# Patient Record
Sex: FEMALE | Race: WHITE | Employment: OTHER | URBAN - NONMETROPOLITAN AREA
[De-identification: names, ages, dates, MRNs, and addresses within clinical notes are randomized per-mention and may not be internally consistent; named-entity substitution may affect disease eponyms.]

---

## 2019-03-18 ENCOUNTER — TELEPHONE (OUTPATIENT)
Dept: FAMILY MEDICINE CLINIC | Age: 64
End: 2019-03-18

## 2019-04-04 ENCOUNTER — OFFICE VISIT (OUTPATIENT)
Dept: FAMILY MEDICINE CLINIC | Age: 64
End: 2019-04-04
Payer: COMMERCIAL

## 2019-04-04 VITALS
BODY MASS INDEX: 26.2 KG/M2 | SYSTOLIC BLOOD PRESSURE: 132 MMHG | WEIGHT: 163 LBS | HEART RATE: 76 BPM | DIASTOLIC BLOOD PRESSURE: 70 MMHG | RESPIRATION RATE: 16 BRPM | HEIGHT: 66 IN

## 2019-04-04 DIAGNOSIS — E53.8 B12 DEFICIENCY: ICD-10-CM

## 2019-04-04 DIAGNOSIS — Z00.00 ANNUAL PHYSICAL EXAM: Primary | ICD-10-CM

## 2019-04-04 DIAGNOSIS — Z12.31 ENCOUNTER FOR SCREENING MAMMOGRAM FOR MALIGNANT NEOPLASM OF BREAST: ICD-10-CM

## 2019-04-04 DIAGNOSIS — Z23 NEED FOR TDAP VACCINATION: ICD-10-CM

## 2019-04-04 DIAGNOSIS — E04.1 THYROID NODULE: ICD-10-CM

## 2019-04-04 PROCEDURE — 99386 PREV VISIT NEW AGE 40-64: CPT | Performed by: FAMILY MEDICINE

## 2019-04-04 PROCEDURE — 90471 IMMUNIZATION ADMIN: CPT | Performed by: FAMILY MEDICINE

## 2019-04-04 PROCEDURE — 90715 TDAP VACCINE 7 YRS/> IM: CPT | Performed by: FAMILY MEDICINE

## 2019-04-04 RX ORDER — LANOLIN ALCOHOL/MO/W.PET/CERES
500 CREAM (GRAM) TOPICAL DAILY
COMMUNITY

## 2019-04-04 ASSESSMENT — ENCOUNTER SYMPTOMS
ABDOMINAL PAIN: 0
NAUSEA: 0
VOMITING: 0
DIARRHEA: 0
BLOOD IN STOOL: 0
SHORTNESS OF BREATH: 0
EYES NEGATIVE: 1

## 2019-04-04 ASSESSMENT — PATIENT HEALTH QUESTIONNAIRE - PHQ9
SUM OF ALL RESPONSES TO PHQ QUESTIONS 1-9: 2
SUM OF ALL RESPONSES TO PHQ QUESTIONS 1-9: 2
SUM OF ALL RESPONSES TO PHQ9 QUESTIONS 1 & 2: 2
2. FEELING DOWN, DEPRESSED OR HOPELESS: 1
1. LITTLE INTEREST OR PLEASURE IN DOING THINGS: 1

## 2019-04-04 NOTE — PROGRESS NOTES
Mammogram scheduled at [address] [place] [address] 5/1/19 at 920 am  STR LOS DIGITAL SCREEN W OR WO CAD BILATERAL  PREPS: * Arrive 15 minutes prior * No powder, lotion, or deodorant under the arms or around the breast area * No perfumes or scented products  [facility] [facility] [address], [place], [place]    Thyroid US scheduled at [facility] on 5/1/19 at 10 am  STR US HEAD NECK SOFT TISSUE THYROID  PREPS:  * Arrive 30 minutes prior  * Bring a list of current medications  * Please report to [address] [place] [address] Radiology 1st floor         Pt notified of appt date, times and prep    Immunizations     Name Date Dose Route    Tdap (Boostrix, Adacel) 4/4/2019 0.5 mL Intramuscular    Site: Deltoid- Left    Lot: [id number]    NDC: 23049-326-57        After obtaining consent, and per orders of Dr. Arash Aparicio, injection of Adacel given in Left deltoid by Irving Poe.  Patient tolerated well

## 2019-04-04 NOTE — PATIENT INSTRUCTIONS
Patient Education        Well Visit, Women 48 to 72: Care Instructions  Your Care Instructions    Physical exams can help you stay healthy. Your doctor has checked your overall health and may have suggested ways to take good care of yourself. He or she also may have recommended tests. At home, you can help prevent illness with healthy eating, regular exercise, and other steps. Follow-up care is a key part of your treatment and safety. Be sure to make and go to all appointments, and call your doctor if you are having problems. It's also a good idea to know your test results and keep a list of the medicines you take. How can you care for yourself at home? · Reach and stay at a healthy weight. This will lower your risk for many problems, such as obesity, diabetes, heart disease, and high blood pressure. · Get at least 30 minutes of exercise on most days of the week. Walking is a good choice. You also may want to do other activities, such as running, swimming, cycling, or playing tennis or team sports. · Do not smoke. Smoking can make health problems worse. If you need help quitting, talk to your doctor about stop-smoking programs and medicines. These can increase your chances of quitting for good. · Protect your skin from too much sun. When you're outdoors from 10 a.m. to 4 p.m., stay in the shade or cover up with clothing and a hat with a wide brim. Wear sunglasses that block UV rays. Even when it's cloudy, put broad-spectrum sunscreen (SPF 30 or higher) on any exposed skin. · See a dentist one or two times a year for checkups and to have your teeth cleaned. · Wear a seat belt in the car. · Limit alcohol to 1 drink a day. Too much alcohol can cause health problems. Follow your doctor's advice about when to have certain tests. These tests can spot problems early. · Cholesterol.  Your doctor will tell you how often to have this done based on your age, family history, or other things that can increase your risk for heart attack and stroke. · Blood pressure. Have your blood pressure checked during a routine doctor visit. Your doctor will tell you how often to check your blood pressure based on your age, your blood pressure results, and other factors. · Mammogram. Ask your doctor how often you should have a mammogram, which is an X-ray of your breasts. A mammogram can spot breast cancer before it can be felt and when it is easiest to treat. · Pap test and pelvic exam. Ask your doctor how often you should have a Pap test. You may not need to have a Pap test as often as you used to. · Vision. Have your eyes checked every year or two or as often as your doctor suggests. Some experts recommend that you have yearly exams for glaucoma and other age-related eye problems starting at age 48. · Hearing. Tell your doctor if you notice any change in your hearing. You can have tests to find out how well you hear. · Diabetes. Ask your doctor whether you should have tests for diabetes. · Colon cancer. You should begin tests for colon cancer at age 48. You may have one of several tests. Your doctor will tell you how often to have tests based on your age and risk. Risks include whether you already had a precancerous polyp removed from your colon or whether your parents, sisters and brothers, or children have had colon cancer. · Thyroid disease. Talk to your doctor about whether to have your thyroid checked as part of a regular physical exam. Women have an increased chance of a thyroid problem. · Osteoporosis. You should begin tests for bone density at age 72. If you are younger than 72, ask your doctor whether you have factors that may increase your risk for this disease. You may want to have this test before age 72. · Heart attack and stroke risk. At least every 4 to 6 years, you should have your risk for heart attack and stroke assessed.  Your doctor uses factors such as your age, blood pressure, cholesterol, and whether you smoke or have diabetes to show what your risk for a heart attack or stroke is over the next 10 years. When should you call for help? Watch closely for changes in your health, and be sure to contact your doctor if you have any problems or symptoms that concern you. Where can you learn more? Go to [email or web address] and sign in to your [employer] account. Enter Z424 in the "[employer] box to learn more about \"Well Visit, Women 50 to 72: Care Instructions. \"     If you do not have an account, please click on the \"Sign Up Now\" link. Current as of: March 28, 2018  Content Version: 11.9  © 7987-6095 [employer]. Care instructions adapted under license by [facility] [place] ([facility]). If you have questions about a medical condition or this instruction, always ask your healthcare professional. [address] any warranty or liability for your use of this information.

## 2019-04-04 NOTE — PROGRESS NOTES
Chief Complaint   Patient presents with    Established New Doctor     Wellness check - due for thyroid u/s -- has known thyroid nodules. Needs order for mammogram.       Eric Avendano is a 58 y. o.female      Pt presents to establish care. Previous PCP in [place]. She is retired and is now caring for her  and her sister, who are both dealing with illnesses. She feels pretty well. She remains active and exercises regularly. Due for labs, mammogram, colon cancer screening. Has known thyroid nodules but hasn't had an ultrasound in a number of years. History as below. Nonsmoker. Body mass index is 26.31 kg/m². Current medical problems include:  Patient Active Problem List   Diagnosis    B12 deficiency           Past Medical History:   Diagnosis Date    B12 deficiency     Thyroid nodule        Past Surgical History:   Procedure Laterality Date    APPENDECTOMY      HYSTERECTOMY, TOTAL ABDOMINAL      WRIST SURGERY      tendon surgery       No Known Allergies       Current Outpatient Medications:     vitamin D (CHOLECALCIFEROL) 1000 UNIT TABS tablet, Take 500 Units by mouth daily, Disp: , Rfl:     vitamin B-12 (CYANOCOBALAMIN) 1000 MCG tablet, Take 1,000 mcg by mouth daily, Disp: , Rfl:     Family History   Problem Relation Age of Onset    Cancer Mother 36        breast ca, ovarian ca    Breast Cancer Mother     Cancer Father         lung ca    Cancer Sister 48        breast ca    Breast Cancer Other         colon cancer, ovarian cancer (sisters)    Cancer Sister         Ovarian         Social History     Tobacco Use    Smoking status: Never Smoker    Smokeless tobacco: Never Used   Substance Use Topics    Alcohol use: Yes     Comment: socially    Drug use: Never     , Retired. Review of Systems   Constitutional: Negative for chills, fatigue and fever. HENT: Negative. Eyes: Negative. Respiratory: Negative for shortness of breath. Cardiovascular: Negative for chest pain, palpitations and leg swelling. Gastrointestinal: Negative for abdominal pain, blood in stool, diarrhea, nausea and vomiting. Genitourinary: Negative for dysuria. Musculoskeletal: Negative for arthralgias and myalgias. Skin: Negative for rash. Neurological: Negative for dizziness and headaches. Hematological: Negative for adenopathy. Psychiatric/Behavioral: Negative. All other systems reviewed and are negative. OBJECTIVE     /70 (Site: Left Upper Arm, Position: Sitting, Cuff Size: Medium Adult)   Pulse 76   Resp 16   Ht 5' 6\" (1.676 m)   Wt 163 lb (73.9 kg)   Breastfeeding? No   BMI 26.31 kg/m²     Physical Exam   Constitutional: She is oriented to person, place, and time. She appears well-developed and well-nourished. HENT:   Head: Normocephalic and atraumatic. Mouth/Throat: Oropharynx is clear and moist.   TM's normal.   Eyes: Conjunctivae are normal.   Neck: Neck supple. Carotid bruit is not present. No thyromegaly present. Cardiovascular: Normal rate, regular rhythm and normal heart sounds. No murmur heard. Pulmonary/Chest: Effort normal and breath sounds normal. She has no wheezes. Abdominal: Soft. Bowel sounds are normal. There is no tenderness. There is no rebound and no guarding. Musculoskeletal: She exhibits no edema. Lymphadenopathy:     She has no cervical adenopathy. Neurological: She is alert and oriented to person, place, and time. Skin: Skin is warm and dry. No rash noted. Psychiatric: She has a normal mood and affect. Her behavior is normal.   Vitals reviewed.             Immunization History   Administered Date(s) Administered    Tdap (Boostrix, Adacel) 04/04/2019         Health Maintenance   Topic Date Due    Hepatitis C screen  1955    HIV screen  08/23/1969    Lipid screen  08/23/1994    Breast cancer screen  08/23/1994    Diabetes screen  08/23/1994    Shingles Vaccine (1 of 2) 08/23/2004    Colon cancer screen colonoscopy  08/23/2004    Flu vaccine (Season Ended) 09/01/2019    DTaP/Tdap/Td vaccine (2 - Td) 04/04/2029         ASSESSMENT      1. Annual physical exam    2. Thyroid nodule    3. Encounter for screening mammogram for malignant neoplasm of breast    4. B12 deficiency    5. Need for Tdap vaccination            PLAN       Testing as below    She will let us know when she's ready for referral for screening colonoscopy    Orders Placed This Encounter   Procedures    US HEAD NECK SOFT TISSUE THYROID     Standing Status:   Future     Standing Expiration Date:   4/4/2020     Scheduling Instructions:      [facility]. Wednesdays best     Order Specific Question:   Reason for exam:     Answer:   follow up thyroid nodules    LOS DIGITAL SCREEN W CAD BILATERAL     Standing Status:   Future     Standing Expiration Date:   6/4/2020     Order Specific Question:   Reason for exam:     Answer:   screening    Tdap (age 10y-63y) IM (Adacel)    Comprehensive Metabolic Panel     Standing Status:   Future     Standing Expiration Date:   4/3/2020    CBC Auto Differential     Standing Status:   Future     Standing Expiration Date:   4/4/2020    Lipid Panel     Standing Status:   Future     Standing Expiration Date:   4/3/2020     Order Specific Question:   Is Patient Fasting?/# of Hours     Answer:   12    TSH with Reflex     Standing Status:   Future     Standing Expiration Date:   4/4/2020    Vitamin B12 & Folate     Standing Status:   Future     Standing Expiration Date:   4/4/2020     Cindy received counseling on the following healthy behaviors: nutrition, exercise and medication adherence    Patient given educational materials on wellness for age. Discussed use, benefit, and side effects of prescribed medications. Barriers to medication compliance addressed. All patient questions answered. Pt voiced understanding.             Electronically signed by Kit Moss MD on 4/4/2019 at 7:28 PM

## 2019-05-01 ENCOUNTER — TELEPHONE (OUTPATIENT)
Dept: FAMILY MEDICINE CLINIC | Age: 64
End: 2019-05-01

## 2019-05-01 ENCOUNTER — HOSPITAL ENCOUNTER (OUTPATIENT)
Age: 64
Discharge: HOME | End: 2019-05-01
Payer: COMMERCIAL

## 2019-05-01 ENCOUNTER — HOSPITAL ENCOUNTER (OUTPATIENT)
Dept: ULTRASOUND IMAGING | Age: 64
Discharge: HOME | End: 2019-05-01
Payer: COMMERCIAL

## 2019-05-01 ENCOUNTER — HOSPITAL ENCOUNTER (OUTPATIENT)
Dept: WOMENS IMAGING | Age: 64
Discharge: HOME | End: 2019-05-01
Payer: COMMERCIAL

## 2019-05-01 DIAGNOSIS — E04.1 THYROID NODULE: ICD-10-CM

## 2019-05-01 DIAGNOSIS — Z00.00 ANNUAL PHYSICAL EXAM: ICD-10-CM

## 2019-05-01 DIAGNOSIS — E04.1 LEFT THYROID NODULE: Primary | ICD-10-CM

## 2019-05-01 DIAGNOSIS — E53.8 B12 DEFICIENCY: ICD-10-CM

## 2019-05-01 DIAGNOSIS — Z12.31 ENCOUNTER FOR SCREENING MAMMOGRAM FOR MALIGNANT NEOPLASM OF BREAST: ICD-10-CM

## 2019-05-01 LAB
ALBUMIN SERPL-MCNC: 4 G/DL (ref 3.5–5.1)
ALP BLD-CCNC: 83 U/L (ref 38–126)
ALT SERPL-CCNC: 12 U/L (ref 11–66)
ANION GAP SERPL CALCULATED.3IONS-SCNC: 7 MEQ/L (ref 8–16)
AST SERPL-CCNC: 15 U/L (ref 5–40)
BASOPHILS # BLD: 0.7 %
BASOPHILS ABSOLUTE: 0 THOU/MM3 (ref 0–0.1)
BILIRUB SERPL-MCNC: 0.3 MG/DL (ref 0.3–1.2)
BUN BLDV-MCNC: 14 MG/DL (ref 7–22)
CALCIUM SERPL-MCNC: 8.8 MG/DL (ref 8.5–10.5)
CHLORIDE BLD-SCNC: 107 MEQ/L (ref 98–111)
CHOLESTEROL, TOTAL: 231 MG/DL (ref 100–199)
CO2: 26 MEQ/L (ref 23–33)
CREAT SERPL-MCNC: 0.5 MG/DL (ref 0.4–1.2)
EOSINOPHIL # BLD: 2.1 %
EOSINOPHILS ABSOLUTE: 0.1 THOU/MM3 (ref 0–0.4)
ERYTHROCYTE [DISTWIDTH] IN BLOOD BY AUTOMATED COUNT: 12.6 % (ref 11.5–14.5)
ERYTHROCYTE [DISTWIDTH] IN BLOOD BY AUTOMATED COUNT: 43.1 FL (ref 35–45)
FOLATE: 12.5 NG/ML (ref 4.8–24.2)
GFR SERPL CREATININE-BSD FRML MDRD: > 90 ML/MIN/1.73M2
GLUCOSE BLD-MCNC: 105 MG/DL (ref 70–108)
HCT VFR BLD CALC: 41.8 % (ref 37–47)
HDLC SERPL-MCNC: 62 MG/DL
HEMOGLOBIN: 13.6 GM/DL (ref 12–16)
IMMATURE GRANS (ABS): 0 THOU/MM3 (ref 0–0.07)
IMMATURE GRANULOCYTES: 0 %
LDL CHOLESTEROL CALCULATED: 155 MG/DL
LYMPHOCYTES # BLD: 36.7 %
LYMPHOCYTES ABSOLUTE: 1.6 THOU/MM3 (ref 1–4.8)
MCH RBC QN AUTO: 30.3 PG (ref 26–33)
MCHC RBC AUTO-ENTMCNC: 32.5 GM/DL (ref 32.2–35.5)
MCV RBC AUTO: 93.1 FL (ref 81–99)
MONOCYTES # BLD: 8.1 %
MONOCYTES ABSOLUTE: 0.3 THOU/MM3 (ref 0.4–1.3)
NUCLEATED RED BLOOD CELLS: 0 /100 WBC
PLATELET # BLD: 277 THOU/MM3 (ref 130–400)
PMV BLD AUTO: 11.1 FL (ref 9.4–12.4)
POTASSIUM SERPL-SCNC: 4.5 MEQ/L (ref 3.5–5.2)
RBC # BLD: 4.49 MILL/MM3 (ref 4.2–5.4)
SEG NEUTROPHILS: 52.4 %
SEGMENTED NEUTROPHILS ABSOLUTE COUNT: 2.3 THOU/MM3 (ref 1.8–7.7)
SODIUM BLD-SCNC: 140 MEQ/L (ref 135–145)
TOTAL PROTEIN: 6.6 G/DL (ref 6.1–8)
TRIGL SERPL-MCNC: 68 MG/DL (ref 0–199)
TSH SERPL DL<=0.05 MIU/L-ACNC: 0.81 UIU/ML (ref 0.4–4.2)
VITAMIN B-12: 1328 PG/ML (ref 211–911)
WBC # BLD: 4.3 THOU/MM3 (ref 4.8–10.8)

## 2019-05-01 PROCEDURE — 82746 ASSAY OF FOLIC ACID SERUM: CPT

## 2019-05-01 PROCEDURE — 82607 VITAMIN B-12: CPT

## 2019-05-01 PROCEDURE — 77063 BREAST TOMOSYNTHESIS BI: CPT

## 2019-05-01 PROCEDURE — 80061 LIPID PANEL: CPT

## 2019-05-01 PROCEDURE — 85025 COMPLETE CBC W/AUTO DIFF WBC: CPT

## 2019-05-01 PROCEDURE — 36415 COLL VENOUS BLD VENIPUNCTURE: CPT

## 2019-05-01 PROCEDURE — 84443 ASSAY THYROID STIM HORMONE: CPT

## 2019-05-01 PROCEDURE — 80053 COMPREHEN METABOLIC PANEL: CPT

## 2019-05-01 PROCEDURE — 76536 US EXAM OF HEAD AND NECK: CPT

## 2019-05-01 NOTE — TELEPHONE ENCOUNTER
Patient notified of results and is agreeable to FNA biopsy. Prefers [facility], after 11 am, pt cant do on 5/10/19. Notified pt we will call tomorrow to schedule.

## 2019-05-01 NOTE — TELEPHONE ENCOUNTER
Patient notified of lab results and that she can decrease her b12 supplement to 1/2 tab daily    Pt verbalized understanding.

## 2019-05-01 NOTE — TELEPHONE ENCOUNTER
----- Message from Estiven Parikh MD sent at 5/1/2019  4:24 PM EDT -----  Notify her that her thyroid US shows multiple nodules, two of which are considered intermediate suspicion for malignancy. FNA biopsy is recommended for the nodule in the left lower lobe . Please arrange for her if she wants.  CG

## 2019-05-02 NOTE — TELEPHONE ENCOUNTER
Order faxed to IR at [phone]  They will contact her to schedule.   Patient notified IR will contact her to schedule the appt and to call us if she does not hear from them by early next week

## 2019-05-09 ENCOUNTER — HOSPITAL ENCOUNTER (OUTPATIENT)
Dept: WOMENS IMAGING | Age: 64
Discharge: HOME | End: 2019-05-09
Payer: COMMERCIAL

## 2019-05-09 DIAGNOSIS — Z00.6 ENCOUNTER FOR EXAMINATION FOR NORMAL COMPARISON OR CONTROL IN CLINICAL RESEARCH PROGRAM: ICD-10-CM

## 2019-05-09 PROCEDURE — 3209999900 MAM COMPARISON OF OUTSIDE IMAGES

## 2019-05-13 ENCOUNTER — TELEPHONE (OUTPATIENT)
Dept: FAMILY MEDICINE CLINIC | Age: 64
End: 2019-05-13

## 2019-05-13 ENCOUNTER — HOSPITAL ENCOUNTER (OUTPATIENT)
Dept: ULTRASOUND IMAGING | Age: 64
Discharge: HOME | End: 2019-05-13
Payer: COMMERCIAL

## 2019-05-13 DIAGNOSIS — E04.1 LEFT THYROID NODULE: ICD-10-CM

## 2019-05-13 PROCEDURE — 88177 CYTP FNA EVAL EA ADDL: CPT

## 2019-05-13 PROCEDURE — 88172 CYTP DX EVAL FNA 1ST EA SITE: CPT

## 2019-05-13 PROCEDURE — 76942 ECHO GUIDE FOR BIOPSY: CPT

## 2019-05-13 PROCEDURE — 88173 CYTOPATH EVAL FNA REPORT: CPT

## 2019-05-13 NOTE — H&P
Formulation and discussion of sedation / procedure plans, risks, benefits, side effects and alternatives with patient and/or responsible adult completed. Electronically signed by JUAN PABLO Palomares DO on 5/13/2019 at 2:39 PM

## 2019-05-13 NOTE — BRIEF OP NOTE
Brief Postoperative Note    Cindy Brower  YOB: 1955  [id number]    Pre-operative Diagnosis: Thyroid nodule    Post-operative Diagnosis: Same    Procedure: US FNA biopsy    Anesthesia: Local    Surgeons/Assistants: JUAN PABLO Snow DO    Estimated Blood Loss: less than 50     Complications: None    Specimens: Was Obtained: with on site cytopathology evaluation    Findings: Full report to follow in PACS. Electronically signed by JUAN PABLO Jimenez DO on 5/13/2019 at 2:39 PM

## 2019-05-13 NOTE — TELEPHONE ENCOUNTER
Patient notified, advised her to call the facility to get extra views set up.   Pt will call and call us back if any problems

## 2019-05-13 NOTE — TELEPHONE ENCOUNTER
Patient states that she never got any results from her mammogram that was done about 2 weeks ago. I told her that they sent her a letter, but she says that she never got anything. Can we give her the results.   Please call her back at [phone]

## 2019-05-13 NOTE — TELEPHONE ENCOUNTER
Mammogram showed a nodular density on the right and extra views are needed. The facility should contact her to schedule those. If not, have her contact them but they are generally really good about following up on these things.  CG

## 2019-05-15 ENCOUNTER — HOSPITAL ENCOUNTER (OUTPATIENT)
Dept: WOMENS IMAGING | Age: 64
Discharge: HOME | End: 2019-05-15
Payer: COMMERCIAL

## 2019-05-15 ENCOUNTER — TELEPHONE (OUTPATIENT)
Dept: FAMILY MEDICINE CLINIC | Age: 64
End: 2019-05-15

## 2019-05-15 ENCOUNTER — PATIENT MESSAGE (OUTPATIENT)
Dept: FAMILY MEDICINE CLINIC | Age: 64
End: 2019-05-15

## 2019-05-15 DIAGNOSIS — R92.2 BREAST DENSITY: ICD-10-CM

## 2019-05-15 DIAGNOSIS — E04.1 THYROID NODULE: Primary | ICD-10-CM

## 2019-05-15 PROCEDURE — 76642 ULTRASOUND BREAST LIMITED: CPT

## 2019-05-15 PROCEDURE — G0279 TOMOSYNTHESIS, MAMMO: HCPCS

## 2019-05-15 NOTE — TELEPHONE ENCOUNTER
Pt notified the pathology from her thyroid biopsy showed a benign colloid nodule. Recommend repeat thyroid US in 1 year. US thyroid 5/11/2020 arrive 9:30. Pt notified via [employer].

## 2019-05-15 NOTE — TELEPHONE ENCOUNTER
----- Message from Lauren Cardenas MD sent at 5/15/2019 11:55 AM EDT -----  Please notify her that the pathology from her thyroid biopsy showed a benign colloid nodule. Recommend repeat thyroid US in 1 year.  CG

## 2019-05-30 ENCOUNTER — TELEPHONE (OUTPATIENT)
Dept: FAMILY MEDICINE CLINIC | Age: 64
End: 2019-05-30

## 2019-11-05 ENCOUNTER — HOSPITAL ENCOUNTER (OUTPATIENT)
Dept: WOMENS IMAGING | Age: 64
Discharge: HOME | End: 2019-11-05
Payer: MEDICARE

## 2019-11-05 DIAGNOSIS — Z09 FOLLOW-UP EXAM: ICD-10-CM

## 2019-11-05 DIAGNOSIS — R92.2 BREAST DENSITY: ICD-10-CM

## 2019-11-05 PROCEDURE — G0279 TOMOSYNTHESIS, MAMMO: HCPCS

## 2020-01-16 ENCOUNTER — OFFICE VISIT (OUTPATIENT)
Dept: FAMILY MEDICINE CLINIC | Age: 65
End: 2020-01-16
Payer: MEDICARE

## 2020-01-16 VITALS
HEART RATE: 76 BPM | SYSTOLIC BLOOD PRESSURE: 120 MMHG | HEIGHT: 66 IN | RESPIRATION RATE: 14 BRPM | BODY MASS INDEX: 26.03 KG/M2 | DIASTOLIC BLOOD PRESSURE: 62 MMHG | WEIGHT: 162 LBS

## 2020-01-16 PROCEDURE — G8427 DOCREV CUR MEDS BY ELIG CLIN: HCPCS | Performed by: FAMILY MEDICINE

## 2020-01-16 PROCEDURE — G0402 INITIAL PREVENTIVE EXAM: HCPCS | Performed by: FAMILY MEDICINE

## 2020-01-16 PROCEDURE — G8417 CALC BMI ABV UP PARAM F/U: HCPCS | Performed by: FAMILY MEDICINE

## 2020-01-16 PROCEDURE — 4040F PNEUMOC VAC/ADMIN/RCVD: CPT | Performed by: FAMILY MEDICINE

## 2020-01-16 PROCEDURE — 99213 OFFICE O/P EST LOW 20 MIN: CPT | Performed by: FAMILY MEDICINE

## 2020-01-16 PROCEDURE — G8400 PT W/DXA NO RESULTS DOC: HCPCS | Performed by: FAMILY MEDICINE

## 2020-01-16 PROCEDURE — 1123F ACP DISCUSS/DSCN MKR DOCD: CPT | Performed by: FAMILY MEDICINE

## 2020-01-16 PROCEDURE — G8484 FLU IMMUNIZE NO ADMIN: HCPCS | Performed by: FAMILY MEDICINE

## 2020-01-16 PROCEDURE — 1036F TOBACCO NON-USER: CPT | Performed by: FAMILY MEDICINE

## 2020-01-16 PROCEDURE — 1090F PRES/ABSN URINE INCON ASSESS: CPT | Performed by: FAMILY MEDICINE

## 2020-01-16 PROCEDURE — 3017F COLORECTAL CA SCREEN DOC REV: CPT | Performed by: FAMILY MEDICINE

## 2020-01-16 ASSESSMENT — PATIENT HEALTH QUESTIONNAIRE - PHQ9
SUM OF ALL RESPONSES TO PHQ QUESTIONS 1-9: 0
SUM OF ALL RESPONSES TO PHQ QUESTIONS 1-9: 0

## 2020-01-16 ASSESSMENT — LIFESTYLE VARIABLES: HOW OFTEN DO YOU HAVE A DRINK CONTAINING ALCOHOL: 0

## 2020-01-16 NOTE — PROGRESS NOTES
[facility] does not have Audiology center, referral and form faxed to [facility] at [phone]. Audiology will call and schedule pt, pt was informed and will be expecting their call.

## 2020-01-16 NOTE — PATIENT INSTRUCTIONS
Personalized Preventive Plan for Cindy Sheth - 1/16/2020  Medicare offers a range of preventive health benefits. Some of the tests and screenings are paid in full while other may be subject to a deductible, co-insurance, and/or copay. Some of these benefits include a comprehensive review of your medical history including lifestyle, illnesses that may run in your family, and various assessments and screenings as appropriate. After reviewing your medical record and screening and assessments performed today your provider may have ordered immunizations, labs, imaging, and/or referrals for you. A list of these orders (if applicable) as well as your Preventive Care list are included within your After Visit Summary for your review. Other Preventive Recommendations:    · A preventive eye exam performed by an eye specialist is recommended every 1-2 years to screen for glaucoma; cataracts, macular degeneration, and other eye disorders. · A preventive dental visit is recommended every 6 months. · Try to get at least 150 minutes of exercise per week or 10,000 steps per day on a pedometer . · Order or download the FREE \"Exercise & Physical Activity: Your Everyday Guide\" from [employer]. Call 1-683.653.4519 or search [employer] online. · You need 6286-8834 mg of calcium and 9333-2163 IU of vitamin D per day. It is possible to meet your calcium requirement with diet alone, but a vitamin D supplement is usually necessary to meet this goal.  · When exposed to the sun, use a sunscreen that protects against both UVA and UVB radiation with an SPF of 30 or greater. Reapply every 2 to 3 hours or after sweating, drying off with a towel, or swimming. · Always wear a seat belt when traveling in a car. Always wear a helmet when riding a bicycle or motorcycle. Learning About Living Jacob Stevenson  What is a living will?     A living will is a legal form you use to write down the kind of care you want at the end of your life. It is used by the health professionals who will treat you if you aren't able to decide for yourself. If you put your wishes in writing, your loved ones and others will know what kind of care you want. They won't need to guess. This can ease your mind and be helpful to others. A living will is not the same as an estate or property will. An estate will explains what you want to happen with your money and property after you die. Is a living will a legal document? A living will is a legal document. Each state has its own laws about living dasilva. If you move to another state, make sure that your living will is legal in the state where you now live. Or you might use a universal form that has been approved by many states. This kind of form can sometimes be completed and stored online. Your electronic copy will then be available wherever you have a connection to the Internet. In most cases, doctors will respect your wishes even if you have a form from a different state. You don't need an  to complete a living will. But legal advice can be helpful if your state's laws are unclear, your health history is complicated, or your family can't agree on what should be in your living will. You can change your living will at any time. Some people find that their wishes about end-of-life care change as their health changes. In addition to making a living will, think about completing a medical power of  form. This form lets you name the person you want to make end-of-life treatment decisions for you (your \"health care agent\") if you're not able to. Many hospitals and nursing homes will give you the forms you need to complete a living will and a medical power of . Your living will is used only if you can't make or communicate decisions for yourself anymore.  If you become able to make decisions again, you can accept or refuse any treatment, no matter what you wrote in your living will. Your state may offer an online registry. This is a place where you can store your living will online so the doctors and nurses who need to treat you can find it right away. What should you think about when creating a living will? Talk about your end-of-life wishes with your family members and your doctor. Let them know what you want. That way the people making decisions for you won't be surprised by your choices. Think about these questions as you make your living will:  Do you know enough about life support methods that might be used? If not, talk to your doctor so you know what might be done if you can't breathe on your own, your heart stops, or you're unable to swallow. What things would you still want to be able to do after you receive life-support methods? Would you want to be able to walk? To speak? To eat on your own? To live without the help of machines? If you have a choice, where do you want to be cared for? In your home? At a hospital or nursing home? Do you want certain Taoism practices performed if you become very ill? If you have a choice at the end of your life, where would you prefer to die? At home? In a hospital or nursing home? Somewhere else? Would you prefer to be buried or cremated? Do you want your organs to be donated after you die? What should you do with your living will? Make sure that your family members and your health care agent have copies of your living will. Give your doctor a copy of your living will to keep in your medical record. If you have more than one doctor, make sure that each one has a copy. You may want to put a copy of your living will where it can be easily found. Where can you learn more? Go to [email or web address] org and sign in to your [employer] account. Enter T783 in the [employer] box to learn more about \"Learning About Living Jacquelin Snow. \"     If you do not have an account, please click on the \"Sign Up Now\" link. Current as of: April 1, 2019  Content Version: 12.3  © 9530-6659 [employer], Incorporated. Care instructions adapted under license by [facility] ([facility]). If you have questions about a medical condition or this instruction, always ask your healthcare professional. [address] any warranty or liability for your use of this information.     ·

## 2020-01-16 NOTE — PROGRESS NOTES
Chief Complaint   Patient presents with    Medicare AWV     skin check, ringing in Abad Gilmore is a 71 y. o.female      Pt complains of 2 spots on her scalp that are larger than previous. Her hairdresser noticed them when she was doing her hair. They don't itch or bother her. No drainage from them. C/o tinnitus in both ears. 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 Has been around loud noises over time. Denies earache. She is unsure if she has any hearing loss. She would like to have hearing testing at this point. Nonsmoker. Body mass index is 26.15 kg/m². Due for labs and colon cancer screening soon. Review of Systems   Constitutional: Negative. HENT: Positive for tinnitus. Negative for ear discharge, ear pain and hearing loss. Respiratory: Negative. Cardiovascular: Negative. Gastrointestinal: Negative. Genitourinary: Negative. Neurological: Negative. All other systems reviewed and are negative. OBJECTIVE     /62   Pulse 76   Resp 14   Ht 5' 6\" (1.676 m)   Wt 162 lb (73.5 kg)   BMI 26.15 kg/m²     Physical Exam  Vitals signs reviewed. Constitutional:       General: She is not in acute distress. Appearance: She is well-developed. HENT:      Head: Normocephalic and atraumatic. Right Ear: Tympanic membrane normal.      Left Ear: Tympanic membrane normal.      Mouth/Throat:      Mouth: Mucous membranes are moist.      Pharynx: No posterior oropharyngeal erythema. Eyes:      Conjunctiva/sclera: Conjunctivae normal.   Cardiovascular:      Rate and Rhythm: Normal rate and regular rhythm. Heart sounds: No murmur. Pulmonary:      Breath sounds: Normal breath sounds. No wheezing. Lymphadenopathy:      Cervical: No cervical adenopathy. Neurological:      Mental Status: She is alert. No results found for this visit on 01/16/20. ASSESSMENT      1. Tinnitus of both ears    2. 1955 Race: White      Cindy Mike is here for Medicare AWV (skin check, ringing in ears)    Screenings for behavioral, psychosocial and functional/safety risks, and cognitive dysfunction are all negative except as indicated below. These results, as well as other patient data from the [address] form, are documented in Flowsheets linked to this Encounter. Allergies   Allergen Reactions    Propoxyphene Nausea And Vomiting       Prior to Visit Medications    Medication Sig Taking? Authorizing Provider   vitamin D (CHOLECALCIFEROL) 1000 UNIT TABS tablet Take 500 Units by mouth daily Yes Historical Provider, MD   vitamin B-12 (CYANOCOBALAMIN) 1000 MCG tablet Take 1,000 mcg by mouth daily Yes Historical Provider, MD       Past Medical History:   Diagnosis Date    B12 deficiency     Thyroid nodule        Past Surgical History:   Procedure Laterality Date    APPENDECTOMY      HYSTERECTOMY, TOTAL ABDOMINAL      WRIST SURGERY      tendon surgery       Family History   Problem Relation Age of Onset    Cancer Mother 36        breast ca, ovarian ca    Breast Cancer Mother     Cancer Father         lung ca    Cancer Sister 48        breast ca    Breast Cancer Other         colon cancer, ovarian cancer (sisters)    Cancer Sister         Ovarian       CareTeam (Including outside providers/suppliers regularly involved in providing care):   Patient Care Team:  Raymon Quinn MD as PCP - General (Family Medicine)  Raymon Quinn MD as PCP - [facility] Empaneled Provider    Wt Readings from Last 3 Encounters:   01/16/20 162 lb (73.5 kg)   04/04/19 163 lb (73.9 kg)     Vitals:    01/16/20 1341   BP: 120/62   Pulse: 76   Resp: 14   Weight: 162 lb (73.5 kg)   Height: 5' 6\" (1.676 m)     Body mass index is 26.15 kg/m². Based upon direct observation of the patient, evaluation of cognition reveals recent and remote memory intact.       Patient's complete Health Risk Assessment and screening values have been reviewed and are found in [facility]. The following problems were reviewed today and where indicated follow up appointments were made and/or referrals ordered. Positive Risk Factor Screenings with Interventions:     General Health:  General  In general, how would you say your health is?: Very Good  In the past 7 days, have you experienced any of the following?  New or Increased Pain, New or Increased Fatigue, Loneliness, Social Isolation, Stress or Anger?: None of These  Do you get the social and emotional support that you need?: Yes  Do you have a Living Will?: (!) No  General Health Risk Interventions:  · No Living Will: additional information provided and she will consider this    Hearing/Vision:  No exam data present  Hearing/Vision  Do you or your family notice any trouble with your hearing?: (!) Yes  Do you have difficulty driving, watching TV, or doing any of your daily activities because of your eyesight?: No  Have you had an eye exam within the past year?: (!) No  Hearing/Vision Interventions:  · Hearing concerns:  audiology referral provided  · Vision concerns:  patient encouraged to make appointment with his/her eye specialist    Personalized Preventive Plan   Current Health Maintenance Status  Immunization History   Administered Date(s) Administered    Influenza Virus Vaccine 11/04/2019    Pneumococcal Conjugate 13-valent ([facility]) 11/04/2019    Tdap (Boostrix, Adacel) 04/04/2019        Health Maintenance   Topic Date Due    Hepatitis C screen  1955    HIV screen  08/23/1969    Diabetes screen  08/23/1994    Shingles Vaccine (1 of 2) 08/23/2004    Colon cancer screen colonoscopy  08/23/2004    DEXA (modify frequency per FRAX score)  08/23/2019    Annual Wellness Visit (AWV)  11/05/2019    Pneumococcal 65+ years Vaccine (2 of 2 - PPSV23) 11/04/2020    Breast cancer screen  11/05/2020    Lipid screen  05/01/2024    DTaP/Tdap/Td vaccine (2 - Td) 04/04/2029    Flu vaccine Completed     Recommendations for Preventive Services Due: see orders and patient instructions/AVS.  .   Recommended screening schedule for the next 5-10 years is provided to the patient in written form: see Patient Instructions/AVS.            Electronically signed by Edvin Malhotra MD on 1/19/2020 at 7:11 PM

## 2020-01-19 ASSESSMENT — ENCOUNTER SYMPTOMS
RESPIRATORY NEGATIVE: 1
GASTROINTESTINAL NEGATIVE: 1

## 2020-01-27 ENCOUNTER — TELEPHONE (OUTPATIENT)
Dept: FAMILY MEDICINE CLINIC | Age: 65
End: 2020-01-27

## 2020-01-27 NOTE — TELEPHONE ENCOUNTER
----- Message from Jose Guadalupe Long MD sent at 1/27/2020  1:15 PM EST -----  Notify her that her Cologuard testing is negative. Repeat in 3 years.  CG

## 2020-04-06 ENCOUNTER — TELEPHONE (OUTPATIENT)
Dept: FAMILY MEDICINE CLINIC | Age: 65
End: 2020-04-06

## 2020-04-06 NOTE — TELEPHONE ENCOUNTER
Central Scheduling calling in requesting if the ultrasoud of the head, neck, soft tissue, and thyroid was essential of if it can be rescheduled after may 25th. Please advise central scheduling at [phone] option 1.

## 2020-04-14 ENCOUNTER — PATIENT MESSAGE (OUTPATIENT)
Dept: FAMILY MEDICINE CLINIC | Age: 65
End: 2020-04-14

## 2020-04-15 NOTE — TELEPHONE ENCOUNTER
From: Vero Rodriguez  To: Lyndle Landau, MD  Sent: 4/14/2020 5:12 PM EDT  Subject: Non-Urgent Medical Question    I have a new appointment as follows that I cannot make. I am not sure how to reschedule, do I have your office change the appointment? If I need to call and change appointment please send me the telephone number of who I need to contact. The only dates in June I cannot make an appointment is June 1, June 9 and June 22, June 22 is my appointment with Dr. Tamar Moscoso.     Appointment that I need changed:  Appointment Information:   Visit Type: STR US HEAD NECK SOFT TISSUE THYROID   Date: 6/9/2020   Dept: [facility]   Provider: ROSHAN MICHAELS RM 2   Time: 10:30 AM   Length: 30 min

## 2020-05-01 ENCOUNTER — NURSE ONLY (OUTPATIENT)
Dept: LAB | Age: 65
End: 2020-05-01

## 2020-06-03 LAB
CHOLESTEROL, TOTAL: 230 MG/DL
CHOLESTEROL/HDL RATIO: ABNORMAL
HDLC SERPL-MCNC: 56 MG/DL (ref 35–70)
LDL CHOLESTEROL CALCULATED: 161 MG/DL (ref 0–160)
TRIGL SERPL-MCNC: 67 MG/DL
VLDLC SERPL CALC-MCNC: ABNORMAL MG/DL

## 2020-06-08 ENCOUNTER — TELEPHONE (OUTPATIENT)
Dept: FAMILY MEDICINE CLINIC | Age: 65
End: 2020-06-08

## 2020-06-08 NOTE — TELEPHONE ENCOUNTER
----- Message from Johanne Cool MD sent at 6/8/2020 12:09 PM EDT -----  Calixto Hicks that her labs look good except for elevated cholesterol at 230 and LDL at 161. Have her work on a lowfat diet and exercise to help with this. If worse again next year, would consider meds.  CG

## 2020-06-09 ENCOUNTER — TELEPHONE (OUTPATIENT)
Dept: FAMILY MEDICINE CLINIC | Age: 65
End: 2020-06-09

## 2020-06-10 ENCOUNTER — HOSPITAL ENCOUNTER (OUTPATIENT)
Dept: ULTRASOUND IMAGING | Age: 65
Discharge: HOME | End: 2020-06-10
Payer: MEDICARE

## 2020-06-10 ENCOUNTER — TELEPHONE (OUTPATIENT)
Dept: FAMILY MEDICINE CLINIC | Age: 65
End: 2020-06-10

## 2020-06-10 PROCEDURE — 76536 US EXAM OF HEAD AND NECK: CPT

## 2020-06-10 NOTE — TELEPHONE ENCOUNTER
----- Message from Lyndle Landau, MD sent at 6/10/2020  3:22 PM EDT -----  Kota Pineda that her thyroid ultrasound shows that her nodules are stable. Follow up [address] in 1 year recommended.  CG

## 2020-06-11 ENCOUNTER — HOSPITAL ENCOUNTER (OUTPATIENT)
Dept: WOMENS IMAGING | Age: 65
Discharge: HOME | End: 2020-06-11
Payer: MEDICARE

## 2020-06-11 PROCEDURE — 77063 BREAST TOMOSYNTHESIS BI: CPT

## 2020-06-22 ENCOUNTER — OFFICE VISIT (OUTPATIENT)
Dept: FAMILY MEDICINE CLINIC | Age: 65
End: 2020-06-22
Payer: MEDICARE

## 2020-06-22 VITALS
WEIGHT: 148.8 LBS | RESPIRATION RATE: 16 BRPM | DIASTOLIC BLOOD PRESSURE: 70 MMHG | TEMPERATURE: 97.6 F | HEART RATE: 72 BPM | SYSTOLIC BLOOD PRESSURE: 104 MMHG | HEIGHT: 66 IN | BODY MASS INDEX: 23.91 KG/M2

## 2020-06-22 PROBLEM — H69.90: Status: ACTIVE | Noted: 2020-06-22

## 2020-06-22 PROBLEM — H93.19 TINNITUS: Status: ACTIVE | Noted: 2020-06-22

## 2020-06-22 PROBLEM — E04.1 THYROID NODULE: Status: ACTIVE | Noted: 2020-06-22

## 2020-06-22 PROBLEM — E78.5 HYPERLIPIDEMIA: Status: ACTIVE | Noted: 2020-06-22

## 2020-06-22 PROBLEM — H69.90 ETD (EUSTACHIAN TUBE DYSFUNCTION): Status: ACTIVE | Noted: 2020-06-22

## 2020-06-22 PROBLEM — H91.8X3 ASYMMETRICAL HEARING LOSS: Status: ACTIVE | Noted: 2020-06-22

## 2020-06-22 PROBLEM — H91.8X9 ASYMMETRICAL HEARING LOSS: Status: ACTIVE | Noted: 2020-06-22

## 2020-06-22 PROBLEM — H69.80 ETD (EUSTACHIAN TUBE DYSFUNCTION): Status: ACTIVE | Noted: 2020-06-22

## 2020-06-22 PROCEDURE — 3017F COLORECTAL CA SCREEN DOC REV: CPT | Performed by: FAMILY MEDICINE

## 2020-06-22 PROCEDURE — G8400 PT W/DXA NO RESULTS DOC: HCPCS | Performed by: FAMILY MEDICINE

## 2020-06-22 PROCEDURE — G8427 DOCREV CUR MEDS BY ELIG CLIN: HCPCS | Performed by: FAMILY MEDICINE

## 2020-06-22 PROCEDURE — 1090F PRES/ABSN URINE INCON ASSESS: CPT | Performed by: FAMILY MEDICINE

## 2020-06-22 PROCEDURE — 1036F TOBACCO NON-USER: CPT | Performed by: FAMILY MEDICINE

## 2020-06-22 PROCEDURE — G8420 CALC BMI NORM PARAMETERS: HCPCS | Performed by: FAMILY MEDICINE

## 2020-06-22 PROCEDURE — 99214 OFFICE O/P EST MOD 30 MIN: CPT | Performed by: FAMILY MEDICINE

## 2020-06-22 PROCEDURE — 4040F PNEUMOC VAC/ADMIN/RCVD: CPT | Performed by: FAMILY MEDICINE

## 2020-06-22 PROCEDURE — 1123F ACP DISCUSS/DSCN MKR DOCD: CPT | Performed by: FAMILY MEDICINE

## 2020-06-22 RX ORDER — ATORVASTATIN CALCIUM 20 MG/1
20 TABLET, FILM COATED ORAL DAILY
Qty: 90 TABLET | Refills: 3 | Status: SHIPPED | OUTPATIENT
Start: 2020-06-22 | End: 2020-09-01 | Stop reason: SDUPTHER

## 2020-06-22 ASSESSMENT — ENCOUNTER SYMPTOMS
VOMITING: 0
DIARRHEA: 0
BLOOD IN STOOL: 0
NAUSEA: 0
ABDOMINAL PAIN: 0
EYES NEGATIVE: 1
SHORTNESS OF BREATH: 0

## 2020-06-22 NOTE — PROGRESS NOTES
General: She is not in acute distress. Appearance: She is well-developed. HENT:      Head: Normocephalic and atraumatic. Right Ear: Tympanic membrane normal.      Left Ear: Tympanic membrane normal.      Mouth/Throat:      Mouth: Mucous membranes are moist.      Pharynx: No posterior oropharyngeal erythema. Eyes:      Conjunctiva/sclera: Conjunctivae normal.   Neck:      Musculoskeletal: Neck supple. Thyroid: No thyromegaly. Vascular: No carotid bruit. Cardiovascular:      Rate and Rhythm: Normal rate and regular rhythm. Heart sounds: No murmur. Pulmonary:      Effort: Pulmonary effort is normal.      Breath sounds: Normal breath sounds. No wheezing. Abdominal:      General: Bowel sounds are normal.      Palpations: Abdomen is soft. Tenderness: There is no abdominal tenderness. There is no guarding or rebound. Lymphadenopathy:      Cervical: No cervical adenopathy. Skin:     General: Skin is warm and dry. Findings: No rash. Neurological:      Mental Status: She is alert and oriented to person, place, and time. Psychiatric:         Behavior: Behavior normal.                     MRI results reviewed with the patient in detail    PROCEDURE: US HEAD NECK SOFT TISSUE THYROID       CLINICAL INFORMATION: Follow-up thyroid nodules.       COMPARISON: Thyroid ultrasound dated 5/1/2019 and thyroid biopsy dated 5/13/2019.       TECHNIQUE: Grayscale and color sonographic imaging of the thyroid gland performed in longitudinal and transverse planes.       TECHNICAL DATA:   Right Thyroid - 5.5 x 2.3 x 1.9 cm   Left Thyroid -5.2 x 1.7 x 1.4 cm   Isthmus -0.19 cm           FINDINGS:    THYROID SIZE:    1. There is mild thyromegaly.       NODULES:    1. There are innumerable nodules scattered throughout the thyroid gland, the largest of which are subsequently described.       2.  There is a stable 0.8 x 0.9 x 0.8 cm heterogeneously isoechoic/hypoechoic solid nodule containing small cystic spaces within the lower pole the right lobe of the thyroid gland with peripheral vascularity and a few foci of internal vascularity on the    color images (low to intermediate suspicion pattern American Thyroid Association criteria).       3. There is a stable 1.1 x 0.7 x 0.7 cm isoechoic solid nodule within the lower pole of the left lobe of the thyroid gland with associated peripheral and internal vascularity (low suspicion pattern American Thyroid Association criteria). This nodule is    been previously biopsied on 5/13/2019.       4. There is a stable 1.1 x 0.7 x 0.7 cm isoechoic/hypoechoic solid nodule containing a small peripheral cystic space within the midpole the left lobe of the thyroid gland with internal vascularity (low to intermediate suspicion pattern American Thyroid    Association criteria).       5. There is a 0.9 x 0.5 x 0.4 cm cyst within the upper pole of the right lobe of the thyroid gland without associated vascularity (benign suspicion pattern American Thyroid Association criteria).       PARENCHYMAL ECHOGENICITY/VASCULARITY:    1. Stable heterogeneity of the thyroid parenchymal echogenicity without associated hypervascularity.       MICROLITHIASIS: None.       CERVICAL LYMPHADENOPATHY:    1. There are no pathologically enlarged lymph nodes adjacent to the thyroid gland.               Impression   1. Stable sonographic findings consistent with a multinodular goiter. 2. Additional follow-up thyroid ultrasound examination in one year is recommended to confirm continued stability.               **This report has been created using voice recognition software.  It may contain minor errors which are inherent in voice recognition technology. **       Final report electronically signed by Dr. Faby Guzmán on 6/10/2020 1:47 PM               Immunization History   Administered Date(s) Administered    Influenza Virus Vaccine 11/04/2019    Pneumococcal Conjugate 13-valent ([facility]

## 2020-07-13 ENCOUNTER — TELEPHONE (OUTPATIENT)
Dept: CASE MANAGEMENT | Age: 65
End: 2020-07-13

## 2020-07-13 NOTE — TELEPHONE ENCOUNTER
Name: Isa Hernandez  : 1955  MRN: [id number]    Oncology Navigation Follow-Up Note    Contact Type:  Telephone    Education: Genetic testing and referral process    Notes: Called patient to review risk assessment and she requests referral to [facility] for genetic evaluation. Called Dr. Brian Hinojosa office with request and faxed form. Patient ok with waiting until September. Per risk form completed at [facility] on 2020: Mother has breast cancer age 44, ovarian cancer, age 64; sister breast cancer, age 50; sister ovarian cancer, age 64; sister colon cancer, age 64, paternal aunt pancreatic cancer, age 72. Verified with patient and explained referral process. States she is ok going to [facility] for visit. Will follow.     Electronically signed by Nicole Cortez RN on 2020 at 2:21 PM

## 2020-08-25 LAB — LDL CHOLESTEROL DIRECT: 79 MG/DL

## 2020-08-27 ENCOUNTER — TELEPHONE (OUTPATIENT)
Dept: CASE MANAGEMENT | Age: 65
End: 2020-08-27

## 2020-08-27 NOTE — TELEPHONE ENCOUNTER
Name: Nallely Barrett  : 1955  MRN: [id number]    Oncology Navigation Follow-Up Note    Contact Type:  Telephone    Notes: Called patient re: genetic office unable to reach her to schedule consultation. States \"I am waiting for insurance to call about coverage. I am currently out of town so it will be 2-3 weeks before I can schedule. \" Will follow.     Electronically signed by Francisco Handley RN on 2020 at 11:18 AM

## 2020-09-01 ENCOUNTER — TELEPHONE (OUTPATIENT)
Dept: FAMILY MEDICINE CLINIC | Age: 65
End: 2020-09-01

## 2020-09-01 RX ORDER — ATORVASTATIN CALCIUM 20 MG/1
20 TABLET, FILM COATED ORAL DAILY
Qty: 90 TABLET | Refills: 3 | Status: SHIPPED | OUTPATIENT
Start: 2020-09-01 | End: 2021-06-21

## 2020-09-01 NOTE — TELEPHONE ENCOUNTER
Pt notified  She needs rx sent to [facility]  Order pending        ----- Message from Best Soto MD sent at 9/1/2020  9:21 AM EDT -----  Cholesterol is significantly improved with LDL down to 79. Continue lipitor 20mg daily.  CG

## 2021-01-12 ENCOUNTER — OFFICE VISIT (OUTPATIENT)
Dept: FAMILY MEDICINE CLINIC | Age: 66
End: 2021-01-12
Payer: MEDICARE

## 2021-01-12 VITALS
HEART RATE: 88 BPM | WEIGHT: 146 LBS | TEMPERATURE: 96.8 F | RESPIRATION RATE: 20 BRPM | DIASTOLIC BLOOD PRESSURE: 66 MMHG | BODY MASS INDEX: 23.57 KG/M2 | SYSTOLIC BLOOD PRESSURE: 116 MMHG

## 2021-01-12 DIAGNOSIS — E78.5 HYPERLIPIDEMIA, UNSPECIFIED HYPERLIPIDEMIA TYPE: Primary | ICD-10-CM

## 2021-01-12 DIAGNOSIS — Z23 NEED FOR PNEUMOCOCCAL VACCINE: ICD-10-CM

## 2021-01-12 DIAGNOSIS — R10.32 LLQ PAIN: ICD-10-CM

## 2021-01-12 DIAGNOSIS — E04.1 THYROID NODULE: ICD-10-CM

## 2021-01-12 DIAGNOSIS — Z01.812 PRE-PROCEDURE LAB EXAM: ICD-10-CM

## 2021-01-12 LAB
BILIRUBIN URINE: NEGATIVE
BLOOD URINE, POC: NEGATIVE
CHARACTER, URINE: CLEAR
COLOR, URINE: YELLOW
GLUCOSE URINE: NEGATIVE MG/DL
KETONES, URINE: NEGATIVE
LEUKOCYTE CLUMPS, URINE: ABNORMAL
NITRITE, URINE: NEGATIVE
PH, URINE: 7.5 (ref 5–9)
PROTEIN, URINE: NEGATIVE MG/DL
SPECIFIC GRAVITY, URINE: 1.01 (ref 1–1.03)
UROBILINOGEN, URINE: 0.2 EU/DL (ref 0–1)

## 2021-01-12 PROCEDURE — G8420 CALC BMI NORM PARAMETERS: HCPCS | Performed by: FAMILY MEDICINE

## 2021-01-12 PROCEDURE — G8400 PT W/DXA NO RESULTS DOC: HCPCS | Performed by: FAMILY MEDICINE

## 2021-01-12 PROCEDURE — 3017F COLORECTAL CA SCREEN DOC REV: CPT | Performed by: FAMILY MEDICINE

## 2021-01-12 PROCEDURE — 99214 OFFICE O/P EST MOD 30 MIN: CPT | Performed by: FAMILY MEDICINE

## 2021-01-12 PROCEDURE — 4040F PNEUMOC VAC/ADMIN/RCVD: CPT | Performed by: FAMILY MEDICINE

## 2021-01-12 PROCEDURE — 1123F ACP DISCUSS/DSCN MKR DOCD: CPT | Performed by: FAMILY MEDICINE

## 2021-01-12 PROCEDURE — G8484 FLU IMMUNIZE NO ADMIN: HCPCS | Performed by: FAMILY MEDICINE

## 2021-01-12 PROCEDURE — 1036F TOBACCO NON-USER: CPT | Performed by: FAMILY MEDICINE

## 2021-01-12 PROCEDURE — 1090F PRES/ABSN URINE INCON ASSESS: CPT | Performed by: FAMILY MEDICINE

## 2021-01-12 PROCEDURE — G8427 DOCREV CUR MEDS BY ELIG CLIN: HCPCS | Performed by: FAMILY MEDICINE

## 2021-01-12 PROCEDURE — 90732 PPSV23 VACC 2 YRS+ SUBQ/IM: CPT | Performed by: FAMILY MEDICINE

## 2021-01-12 PROCEDURE — G0009 ADMIN PNEUMOCOCCAL VACCINE: HCPCS | Performed by: FAMILY MEDICINE

## 2021-01-12 ASSESSMENT — PATIENT HEALTH QUESTIONNAIRE - PHQ9
SUM OF ALL RESPONSES TO PHQ9 QUESTIONS 1 & 2: 0
SUM OF ALL RESPONSES TO PHQ QUESTIONS 1-9: 0
1. LITTLE INTEREST OR PLEASURE IN DOING THINGS: 0
SUM OF ALL RESPONSES TO PHQ QUESTIONS 1-9: 0

## 2021-01-12 ASSESSMENT — ENCOUNTER SYMPTOMS
BACK PAIN: 1
NAUSEA: 0
COUGH: 0
CONSTIPATION: 0
BLOOD IN STOOL: 0
SHORTNESS OF BREATH: 0
ABDOMINAL PAIN: 1
VOMITING: 0
DIARRHEA: 0

## 2021-01-12 NOTE — PROGRESS NOTES
2021      Cindy Conway (:  1955) is a 76 y.o. female,Established patient, here for evaluation of the following chief complaint(s):  6 Month Follow-Up (Here today for 6 month f/up for hyperlipidemia and thyroid nodule. ) and Abdominal Pain (c/o lower abdominal pain and pressure, had to start urinary stream sometimes. )      ASSESSMENT/PLAN:    1. Hyperlipidemia, unspecified hyperlipidemia type  2. Thyroid nodule  3. LLQ pain  -     CT ABDOMEN PELVIS W IV CONTRAST Additional Contrast? Oral; Future  4. Need for pneumococcal vaccine  -     Pneumococcal polysaccharide vaccine 23-valent greater than or equal to 3yo subcutaneous/IM  5. Pre-procedure lab exam  -     Creatinine, Serum; Future    Continue lipitor for cholesterol    Repeat Thyroid US in 2021. Will schedule at next visit. Pneumovax today    CT abd/pelvis with contrast due to recurrent LLQ pain. Ddx: diverticulitis, ovarian cyst, ovarian mass. COVID vaccine when available    Return in about 6 months (around 2021) for AWV. SUBJECTIVE/OBJECTIVE:    HPI  75 yo female here for follow up of chronic conditions as below:    Patient Active Problem List   Diagnosis    Cobalamin deficiency    Asymmetrical hearing loss    ETD (eustachian tube dysfunction)    Negative middle ear pressure    Thyroid nodule    Tinnitus    Hyperlipidemia     C/o intermittent and recurrent LLQ pain over the last few months. Pain is sharp and radiates to the back. She c/o difficulty passing urine at times and lower abdominal pressure with urination and bowel movements. She denies hematochezia or hematuria. These symptoms have been very bothersome to her. No diarrhea, constipation. Her chronic conditions are stable. Takes all meds as directed and denies side effects. No recent illnesses or hospitalizations. Will be due for follow up thyroid US for nodules in . She denies any trouble swallowing or voice change. Due for Pneumovax. Interested in [employer] vaccine. Nonsmoker. Body mass index is 23.57 kg/m². Review of Systems   Constitutional: Negative for fatigue, fever and unexpected weight change. HENT: Negative. Respiratory: Negative for cough and shortness of breath. Cardiovascular: Negative for chest pain and leg swelling. Gastrointestinal: Positive for abdominal pain. Negative for blood in stool, constipation, diarrhea, nausea and vomiting. Genitourinary: Positive for difficulty urinating and pelvic pain. Negative for hematuria. Musculoskeletal: Positive for back pain. Neurological: Negative. Hematological: Negative. All other systems reviewed and are negative. Vitals:    01/12/21 1401   BP: 116/66   Site: Right Upper Arm   Pulse: 88   Resp: 20   Temp: 96.8 °F (36 °C)   Weight: 146 lb (66.2 kg)         BP Readings from Last 3 Encounters:   01/12/21 116/66   06/22/20 104/70   01/16/20 120/62     Wt Readings from Last 3 Encounters:   01/12/21 146 lb (66.2 kg)   06/22/20 148 lb 12.8 oz (67.5 kg)   01/16/20 162 lb (73.5 kg)         Physical Exam  Vitals signs and nursing note reviewed. Constitutional:       General: She is not in acute distress. Appearance: She is well-developed. HENT:      Head: Normocephalic and atraumatic. Right Ear: Tympanic membrane normal.      Left Ear: Tympanic membrane normal.      Mouth/Throat:      Mouth: Mucous membranes are moist.      Pharynx: No posterior oropharyngeal erythema. Eyes:      Conjunctiva/sclera: Conjunctivae normal.   Neck:      Musculoskeletal: Neck supple. Thyroid: No thyromegaly. Vascular: No carotid bruit. Cardiovascular:      Rate and Rhythm: Normal rate and regular rhythm. Heart sounds: No murmur. Pulmonary:      Effort: Pulmonary effort is normal.      Breath sounds: Normal breath sounds. No wheezing. Abdominal:      General: Bowel sounds are normal.      Palpations: Abdomen is soft. Tenderness:  There is abdominal tenderness in the suprapubic area and left lower quadrant. There is no right CVA tenderness, left CVA tenderness, guarding or rebound. Hernia: No hernia is present. Musculoskeletal:      Right lower leg: No edema. Left lower leg: No edema. Lymphadenopathy:      Cervical: No cervical adenopathy. Skin:     General: Skin is warm and dry. Findings: No rash. Neurological:      Mental Status: She is alert and oriented to person, place, and time. Psychiatric:         Behavior: Behavior normal.         Results for POC orders placed in visit on 01/12/21   POCT Urinalysis No Micro (Auto)   Result Value Ref Range    Glucose, Ur Negative NEGATIVE mg/dl    Bilirubin Urine Negative     Ketones, Urine Negative NEGATIVE    Specific Gravity, Urine 1.015 1.002 - 1.030    Blood, UA POC Negative NEGATIVE    pH, Urine 7.50 5.0 - 9.0    Protein, Urine Negative NEGATIVE mg/dl    Urobilinogen, Urine 0.20 0.0 - 1.0 eu/dl    Nitrite, Urine Negative NEGATIVE    Leukocyte Clumps, Urine Trace (A) NEGATIVE    Color, Urine Yellow YELLOW-STRAW    Character, Urine Clear [id number].[id number]     Lab Results   Component Value Date    CHOL 230 06/03/2020    TRIG 67 06/03/2020    HDL 56 06/03/2020    LDLCALC 161 (A) 06/03/2020    LDLDIRECT 79 08/25/2020             An electronic signature was used to authenticate this note.     -    Electronically signed by Brooke Lewis MD on 1/12/2021 at 3:06 PM

## 2021-01-12 NOTE — PROGRESS NOTES
Pt is scheduled for CT abd/pelvis with contrast at [place] [address] on 1/15/21 at 11:00 AM. Pt is to arrive at 10:45 AM, start drinking barium at 9:00 AM. NPO 4 hours except for the barium. Pt given order for creatinine to have done prior to the exam, will have it done when she picks up the barium. Order faxed to [phone].

## 2021-01-15 ENCOUNTER — TELEPHONE (OUTPATIENT)
Dept: FAMILY MEDICINE CLINIC | Age: 66
End: 2021-01-15

## 2021-01-15 DIAGNOSIS — G47.00 INSOMNIA, UNSPECIFIED TYPE: ICD-10-CM

## 2021-01-15 DIAGNOSIS — F41.9 ANXIETY: Primary | ICD-10-CM

## 2021-01-15 RX ORDER — METRONIDAZOLE 500 MG/1
500 TABLET ORAL 3 TIMES DAILY
Qty: 30 TABLET | Refills: 0 | Status: SHIPPED | OUTPATIENT
Start: 2021-01-15 | End: 2021-01-20 | Stop reason: ALTCHOICE

## 2021-01-15 RX ORDER — CIPROFLOXACIN 500 MG/1
500 TABLET, FILM COATED ORAL 2 TIMES DAILY
Qty: 20 TABLET | Refills: 0 | Status: SHIPPED | OUTPATIENT
Start: 2021-01-15 | End: 2021-01-20 | Stop reason: ALTCHOICE

## 2021-01-15 NOTE — TELEPHONE ENCOUNTER
CT abd/pelvis results noted as below:        Patient notified of findings. Rx's for antibiotics sent to her pharmacy as below:    Requested Prescriptions     Signed Prescriptions Disp Refills    ciprofloxacin (CIPRO) 500 MG tablet 20 tablet 0     Sig: Take 1 tablet by mouth 2 times daily for 10 days     Authorizing Provider: Jose Jc    metroNIDAZOLE (FLAGYL) 500 MG tablet 30 tablet 0     Sig: Take 1 tablet by mouth 3 times daily for 10 days     Authorizing Provider: Jose Jc     Refer to GI ASAP. Needs appt early next week. [facility] or [facility] preferred.         Electronically signed by Ryan Soriano MD on 1/15/2021 at 4:50 PM

## 2021-01-18 RX ORDER — CLONAZEPAM 1 MG/1
TABLET ORAL
Qty: 15 TABLET | Refills: 0 | Status: SHIPPED | OUTPATIENT
Start: 2021-01-18 | End: 2021-01-21 | Stop reason: SDUPTHER

## 2021-01-18 NOTE — TELEPHONE ENCOUNTER
Prescription sent to the pharmacy as below. Requested Prescriptions     Signed Prescriptions Disp Refills    ciprofloxacin (CIPRO) 500 MG tablet 20 tablet 0     Sig: Take 1 tablet by mouth 2 times daily for 10 days     Authorizing Provider: Corrinne Guthrie    metroNIDAZOLE (FLAGYL) 500 MG tablet 30 tablet 0     Sig: Take 1 tablet by mouth 3 times daily for 10 days     Authorizing Provider: Corrinne Guthrie    clonazePAM (KLONOPIN) 1 MG tablet 15 tablet 0     Sig: Take 1/2 or 1 po qhs as needed for insomnia or anxiety     Authorizing Provider: Lauren Montez report reviewed and no contraindications or problems noted.           Electronically signed by Betty Yusuf MD on 1/18/2021 at 12:28 PM

## 2021-01-18 NOTE — TELEPHONE ENCOUNTER
Pt is scheduled for an appt with Eva Ramirez CNP (Dr. Robbie Brunner) [place] [facility] [place] at 1:30 PM. She is to arrive at 1:00 PM. Pt notified and is agreeable. Referral and records [place] [facility]

## 2021-01-18 NOTE — TELEPHONE ENCOUNTER
Pt states that she has not slept since Thursday. She is requesting to have a medication sent to [facility] that she can take to relax her and help her sleep. Pt will check with the pharmacy after her appt with GI today.

## 2021-01-19 ENCOUNTER — HOSPITAL ENCOUNTER (OUTPATIENT)
Dept: CT IMAGING | Age: 66
Discharge: HOME | End: 2021-01-19

## 2021-01-19 DIAGNOSIS — Z00.6 EXAMINATION FOR NORMAL COMPARISON FOR CLINICAL RESEARCH: ICD-10-CM

## 2021-01-19 PROBLEM — R19.4 CHANGE IN BOWEL HABITS: Status: ACTIVE | Noted: 2021-01-19

## 2021-01-19 PROBLEM — R10.32 LLQ PAIN: Status: ACTIVE | Noted: 2021-01-19

## 2021-01-19 PROBLEM — R93.3 ABNORMAL CT SCAN, SIGMOID COLON: Status: ACTIVE | Noted: 2021-01-19

## 2021-01-19 PROBLEM — K66.8 OMENTAL MASS: Status: ACTIVE | Noted: 2021-01-19

## 2021-01-19 PROBLEM — R18.8 OTHER ASCITES: Status: ACTIVE | Noted: 2021-01-19

## 2021-01-19 NOTE — PROGRESS NOTES
after her laparoscopy which I do not think will be an issue told her to go ahead and keep that appointment and proceed  Potential diagnostic and therapeutic modalities were discussed with the patient including surgical and nonsurgical options. The risks, complications and benefits of the options were discussed. Complications including, but not limited to, bleeding, wound infection, anastomotic leak, blood clots, bowel obstruction, other organ injury, intra-abdominal abscess, and need for colostomy were reviewed. The patient was given an opportunity to ask questions. Once answered, the patient is agreeable to proceed with surgery. The advantages and disadvantages of using this robotic technology were discussed with the patient including but not limited to technical issues, limitations with exposure and potential conversion to an open procedure. The patient was given an opportunity to ask questions. Once answered, the patient is agreeable to proceed with a planned robotic assisted approach. (ROBOT)  4. Status: outpatient  5. Planned anesthesia: general  6. She will undergo pre-operative clearance per anesthesia guidelines with risk factors listed under the past medical history diagnosis & problem list.  7. Perioperative discontinuation of ASA, Plavix, warfarin, Brillinta, Effient, Pradaxa, Eliquis and Xarelto. Continuation of 81 mg Aspirin is acceptable. 8. Perioperative medical clearance is not required   9. Perioperative bowel preparation with Miralax and Dulcolax. Instructions given and questions answered.   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    Thyroid nodule        Past Surgical History  Past Surgical History:   Procedure Laterality Date    APPENDECTOMY      age 26    [place]ONOSCOPY      [place]-several years ago    HYSTERECTOMY, TOTAL ABDOMINAL      age 26-still has ovaries    WRIST SURGERY Right     tendon surgery       Medications  Current Outpatient Medications on File Prior to Visit   Medication Sig Dispense Refill    clonazePAM (KLONOPIN) 1 MG tablet Take 1/2 or 1 po qhs as needed for insomnia or anxiety 15 tablet 0    atorvastatin (LIPITOR) 20 MG tablet Take 1 tablet by mouth daily 90 tablet 3    vitamin D (CHOLECALCIFEROL) 1000 UNIT TABS tablet Take 500 Units by mouth daily      vitamin B-12 (CYANOCOBALAMIN) 1000 MCG tablet Take 500 mcg by mouth daily        No current facility-administered medications on file prior to visit.       Allergies  Allergies   Allergen Reactions    Propoxyphene Nausea And Vomiting       Family History  Family History   Problem Relation Age of Onset    Cancer Mother 36        ovarian    Breast Cancer Mother     Cancer Father         lung     Brain Cancer Father     Cancer Sister 48        breast     Cancer Sister         Ovarian,colon    No Known Problems Maternal Grandmother     No Known Problems Maternal Grandfather     No Known Problems Paternal Grandmother     No Known Problems Paternal Grandfather        Social History  Social History     Socioeconomic History    Marital status: Legally      Spouse name: Not on file    Number of children: Not on file    Years of education: Not on file    Highest education level: Not on file   Occupational History    Not on file   Social Needs    Financial resource strain: Not on file    Food insecurity     Worry: Not on file     Inability: Not on file   [employer] needs     Medical: Not on file     Non-medical: Not on file   Tobacco Use    Smoking status: Never Smoker    Smokeless tobacco: Never Used   Substance and Sexual Activity    Alcohol use: Yes     Comment: socially    Drug use: Never    Sexual activity: Not on file   Lifestyle    Physical activity     Days per week: Not on file     Minutes per session: Not on file    Stress: Not on file   Relationships    Social connections     Talks on phone: Not on file     Gets together: Not on file     Attends Congregation service: Not on file     Active member of club or organization: Not on file     Attends meetings of clubs or organizations: Not on file     Relationship status: Not on file    Intimate partner violence     Fear of current or ex partner: Not on file     Emotionally abused: Not on file     Physically abused: Not on file     Forced sexual activity: Not on file   Other Topics Concern    Not on file   Social History Narrative    Not on file       [address] Maintenance   Topic Date Due    DEXA (modify frequency per FRAX score)  08/23/2009    Annual Wellness Visit (AWV)  11/05/2019    Breast cancer screen  06/11/2021    Lipid screen  08/25/2021    Colon cancer screen fecal DNA test (Cologuard)  01/22/2023    DTaP/Tdap/Td vaccine (2 - Td) 04/04/2029    Flu vaccine  Completed    Shingles Vaccine  Completed    Pneumococcal 65+ years Vaccine  Completed    Hepatitis C screen  Completed    Hepatitis A vaccine  Aged Out    Hepatitis B vaccine  Aged Out    Hib vaccine  Aged Out    Meningococcal (ACWY) vaccine  Aged Out       Review of Systems  Constitutional: Negative for activity change, appetite change, chills, diaphoresis, fatigue, fever and unexpected weight change.    HENT: Negative for congestion, dental problem, drooling, ear discharge, ear pain, facial swelling, hearing loss, mouth sores, nosebleeds, postnasal drip, rhinorrhea, sinus pressure, sneezing, sore throat, tinnitus, trouble swallowing and voice change. Eyes: Negative for photophobia, pain, discharge, redness, itching and visual disturbance. Respiratory: Negative for apnea, cough, choking, chest tightness, shortness of breath, wheezing and stridor. Cardiovascular: Negative for chest pain, palpitations and leg swelling. Gastrointestinal: Positive for abdominal pain. Negative for abdominal distention, anal bleeding, blood in stool, constipation, diarrhea, nausea, rectal pain and vomiting. Endocrine: Negative for cold intolerance, heat intolerance, polydipsia, polyphagia and polyuria. Genitourinary: Negative for decreased urine volume, difficulty urinating, dysuria, enuresis, flank pain, frequency, genital sores, hematuria and urgency. Musculoskeletal: Positive for back pain. Negative for arthralgias, gait problem, joint swelling, myalgias, neck pain and neck stiffness. Skin: Negative for color change, pallor, rash and wound. Allergic/Immunologic: Negative for environmental allergies, food allergies and immunocompromised state. Neurological: Negative for dizziness, tremors, seizures, syncope, facial asymmetry, speech difficulty, weakness, light-headedness, numbness and headaches. Hematological: Negative for adenopathy. Does not bruise/bleed easily. Psychiatric/Behavioral: Negative for agitation, behavioral problems, confusion, decreased concentration, dysphoric mood, hallucinations, self-injury, sleep disturbance and suicidal ideas. The patient is not nervous/anxious and is not hyperactive. OBJECTIVE    VITALS:  height is 5' 6\" (1.676 m) and weight is 142 lb 1.6 oz (64.5 kg). Her temporal temperature is 97.1 °F (36.2 °C). Her blood pressure is 122/78 and her pulse is 118. Her respiration is 18 and oxygen saturation is 97%. CONSTITUTIONAL: Alert and oriented times 3, no acute distress and cooperative to examination with proper mood and affect.   SKIN: Skin color, texture, turgor normal. No rashes or lesions. LYMPH: no cervical nodes, no inguinal nodes  HEENT: Head is normocephalic, atraumatic. EOMI, PERRLA. NECK: Supple, symmetrical, trachea midline, no adenopathy, thyroid symmetric, not enlarged and no tenderness, skin normal.  CHEST/LUNGS: chest symmetric with normal A/P diameter, normal respiratory rate and rhythm, lungs clear to auscultation without wheezes, rales or rhonchi. No accessory muscle use. Scars None   CARDIOVASCULAR: Heart sounds are normal.  Regular rate and rhythm without murmur, gallop or rub. Normal S1 and S2. . Carotid and femoral pulses 2+/4 and equal bilaterally. ABDOMEN: Normal shape. Low transverse scar(s) present. Normal bowel sounds. No bruits. Darletta Roman \"soft, nontender, nondistended, no masses or organomegaly. no evidence of hernia. Percussion: Normal without hepatosplenomegally. Tenderness: absent. There is no masses at the bottom of the umbilicus, there is no dilated venous channels on the abdominal wall, I can appreciate clinical no ascites, there is no palpable nodularity or masses by abdominal exam.  Her abdomen is nondistended  RECTAL: deferred, not clinically indicated  NEUROLOGIC: There are no focalizing motor or sensory deficits. CN II-XII are grossly intact. Darletta Roman EXTREMITIES: no cyanosis, no clubbing and no edema.                    Electronically signed by Dianna Collins MD on 1/20/2021 at 1:08 PM

## 2021-01-20 ENCOUNTER — OFFICE VISIT (OUTPATIENT)
Dept: SURGERY | Age: 66
End: 2021-01-20
Payer: MEDICARE

## 2021-01-20 ENCOUNTER — TELEPHONE (OUTPATIENT)
Dept: SURGERY | Age: 66
End: 2021-01-20

## 2021-01-20 ENCOUNTER — HOSPITAL ENCOUNTER (OUTPATIENT)
Age: 66
Discharge: HOME | End: 2021-01-20
Payer: MEDICARE

## 2021-01-20 VITALS
RESPIRATION RATE: 18 BRPM | HEIGHT: 66 IN | TEMPERATURE: 97.1 F | SYSTOLIC BLOOD PRESSURE: 122 MMHG | WEIGHT: 142.1 LBS | HEART RATE: 118 BPM | OXYGEN SATURATION: 97 % | DIASTOLIC BLOOD PRESSURE: 78 MMHG | BODY MASS INDEX: 22.84 KG/M2

## 2021-01-20 DIAGNOSIS — R19.4 CHANGE IN BOWEL HABITS: ICD-10-CM

## 2021-01-20 DIAGNOSIS — R93.3 ABNORMAL CT SCAN, SIGMOID COLON: ICD-10-CM

## 2021-01-20 DIAGNOSIS — R18.8 OTHER ASCITES: ICD-10-CM

## 2021-01-20 DIAGNOSIS — Z01.818 PRE-OP TESTING: Primary | ICD-10-CM

## 2021-01-20 DIAGNOSIS — K66.8 OMENTAL MASS: ICD-10-CM

## 2021-01-20 DIAGNOSIS — R10.32 LLQ PAIN: ICD-10-CM

## 2021-01-20 LAB
HCT VFR BLD CALC: 43.3 % (ref 37–47)
HEMOGLOBIN: 14 GM/DL (ref 12–16)

## 2021-01-20 PROCEDURE — G8400 PT W/DXA NO RESULTS DOC: HCPCS | Performed by: SURGERY

## 2021-01-20 PROCEDURE — 1090F PRES/ABSN URINE INCON ASSESS: CPT | Performed by: SURGERY

## 2021-01-20 PROCEDURE — 85014 HEMATOCRIT: CPT

## 2021-01-20 PROCEDURE — G8420 CALC BMI NORM PARAMETERS: HCPCS | Performed by: SURGERY

## 2021-01-20 PROCEDURE — 85018 HEMOGLOBIN: CPT

## 2021-01-20 PROCEDURE — U0003 INFECTIOUS AGENT DETECTION BY NUCLEIC ACID (DNA OR RNA); SEVERE ACUTE RESPIRATORY SYNDROME CORONAVIRUS 2 (SARS-COV-2) (CORONAVIRUS DISEASE [COVID-19]), AMPLIFIED PROBE TECHNIQUE, MAKING USE OF HIGH THROUGHPUT TECHNOLOGIES AS DESCRIBED BY CMS-2020-01-R: HCPCS

## 2021-01-20 PROCEDURE — 1123F ACP DISCUSS/DSCN MKR DOCD: CPT | Performed by: SURGERY

## 2021-01-20 PROCEDURE — G8427 DOCREV CUR MEDS BY ELIG CLIN: HCPCS | Performed by: SURGERY

## 2021-01-20 PROCEDURE — 4040F PNEUMOC VAC/ADMIN/RCVD: CPT | Performed by: SURGERY

## 2021-01-20 PROCEDURE — G8484 FLU IMMUNIZE NO ADMIN: HCPCS | Performed by: SURGERY

## 2021-01-20 PROCEDURE — 36415 COLL VENOUS BLD VENIPUNCTURE: CPT

## 2021-01-20 PROCEDURE — 1036F TOBACCO NON-USER: CPT | Performed by: SURGERY

## 2021-01-20 PROCEDURE — 3017F COLORECTAL CA SCREEN DOC REV: CPT | Performed by: SURGERY

## 2021-01-20 PROCEDURE — 99204 OFFICE O/P NEW MOD 45 MIN: CPT | Performed by: SURGERY

## 2021-01-20 ASSESSMENT — ENCOUNTER SYMPTOMS
EYE ITCHING: 0
ABDOMINAL DISTENTION: 0
SHORTNESS OF BREATH: 0
FACIAL SWELLING: 0
RECTAL PAIN: 0
COLOR CHANGE: 0
SORE THROAT: 0
WHEEZING: 0
VOICE CHANGE: 0
NAUSEA: 0
RHINORRHEA: 0
CHEST TIGHTNESS: 0
CONSTIPATION: 0
VOMITING: 0
CHOKING: 0
PHOTOPHOBIA: 0
SINUS PRESSURE: 0
ANAL BLEEDING: 0
EYE PAIN: 0
COUGH: 0
TROUBLE SWALLOWING: 0
ABDOMINAL PAIN: 1
DIARRHEA: 0
EYE DISCHARGE: 0
EYE REDNESS: 0
BACK PAIN: 1
STRIDOR: 0
APNEA: 0
BLOOD IN STOOL: 0

## 2021-01-20 NOTE — PROGRESS NOTES
Subjective:      Patient ID: Bakari Pelaez is a 76 y.o. female. Chief Complaint   Patient presents with    Surgical Consult     New patient-referred by [facility] CNP-Omental mass and peritoneal nodularity,left lower quadrant pain       HPI    Review of Systems   Constitutional: Negative for activity change, appetite change, chills, diaphoresis, fatigue, fever and unexpected weight change. HENT: Negative for congestion, dental problem, drooling, ear discharge, ear pain, facial swelling, hearing loss, mouth sores, nosebleeds, postnasal drip, rhinorrhea, sinus pressure, sneezing, sore throat, tinnitus, trouble swallowing and voice change. Eyes: Negative for photophobia, pain, discharge, redness, itching and visual disturbance. Respiratory: Negative for apnea, cough, choking, chest tightness, shortness of breath, wheezing and stridor. Cardiovascular: Negative for chest pain, palpitations and leg swelling. Gastrointestinal: Positive for abdominal pain. Negative for abdominal distention, anal bleeding, blood in stool, constipation, diarrhea, nausea, rectal pain and vomiting. Endocrine: Negative for cold intolerance, heat intolerance, polydipsia, polyphagia and polyuria. Genitourinary: Negative for decreased urine volume, difficulty urinating, dysuria, enuresis, flank pain, frequency, genital sores, hematuria and urgency. Musculoskeletal: Positive for back pain. Negative for arthralgias, gait problem, joint swelling, myalgias, neck pain and neck stiffness. Skin: Negative for color change, pallor, rash and wound. Allergic/Immunologic: Negative for environmental allergies, food allergies and immunocompromised state. Neurological: Negative for dizziness, tremors, seizures, syncope, facial asymmetry, speech difficulty, weakness, light-headedness, numbness and headaches. Hematological: Negative for adenopathy. Does not bruise/bleed easily.    Psychiatric/Behavioral: Negative for agitation, behavioral problems, confusion, decreased concentration, dysphoric mood, hallucinations, self-injury, sleep disturbance and suicidal ideas. The patient is not nervous/anxious and is not hyperactive.       /78 (Site: Right Upper Arm, Position: Sitting, Cuff Size: Medium Adult)   Pulse 118   Temp 97.1 °F (36.2 °C) (Temporal)   Resp 18   Ht 5' 6\" (1.676 m)   Wt 142 lb 1.6 oz (64.5 kg)   SpO2 97%   BMI 22.94 kg/m²     Objective:   Physical Exam    Assessment:            Plan:              Sobeida Neal LPN

## 2021-01-20 NOTE — LETTER
[facility]  Elieser Oliver MD FACS  Phone- [phone]  Fax [phone]    Pt Name: Martin Reyes  Medical Record Number: [id number]  Date of Birth 1955   Today's Date: 1/20/2021    Cindy Vick was evaluated in the office today. My assessment and plans are listed below. Assessment:     Cindy was seen today for surgical consult. Diagnoses and all orders for this visit:    Pre-op testing  -     Hemoglobin and Hematocrit, Blood; Future    Omental mass  -     LAPAROSCOPY DIAGNOSTIC / BIOPSY / ASPIRATION / LYSIS; Future  -     COVID-19 Ambulatory; Future  -     Hemoglobin and Hematocrit, Blood; Future    Other ascites  -     LAPAROSCOPY DIAGNOSTIC / BIOPSY / ASPIRATION / LYSIS; Future  -     COVID-19 Ambulatory; Future  -     Hemoglobin and Hematocrit, Blood; Future    LLQ pain  -     Hemoglobin and Hematocrit, Blood; Future    Change in bowel habits    Abnormal CT scan, sigmoid colon  -     LAPAROSCOPY DIAGNOSTIC / BIOPSY / ASPIRATION / LYSIS; Future  -     COVID-19 Ambulatory; Future         Plan:  1. Schedule Cindy for diagnostic laparoscopy with biopsy and frozen section. 2. I suspect based on reviewing her CT scan which was reviewed with the patient and her  today in the office that she likely has carcinomatosis from either gastric ovarian pancreatic or colon cancer. Reviewing her CT scan with her the largest nodule seen measured 1.4 cm but appeared to be difficult to access with CT I think the diagnostic laparoscopy with visualization and adequate biopsies will be important for evaluation.   3. She is scheduled for a colonoscopy 2 days after her laparoscopy which I do not think will be an issue told her to go ahead and keep that appointment and proceed Potential diagnostic and therapeutic modalities were discussed with the patient including surgical and nonsurgical options. The risks, complications and benefits of the options were discussed. Complications including, but not limited to, bleeding, wound infection, anastomotic leak, blood clots, bowel obstruction, other organ injury, intra-abdominal abscess, and need for colostomy were reviewed. The patient was given an opportunity to ask questions. Once answered, the patient is agreeable to proceed with surgery. The advantages and disadvantages of using this robotic technology were discussed with the patient including but not limited to technical issues, limitations with exposure and potential conversion to an open procedure. The patient was given an opportunity to ask questions. Once answered, the patient is agreeable to proceed with a planned robotic assisted approach. (ROBOT)  4. Status: outpatient  5. Planned anesthesia: general  6. She will undergo pre-operative clearance per anesthesia guidelines with risk factors listed under the past medical history diagnosis & problem list.  7. Perioperative discontinuation of ASA, Plavix, warfarin, Brillinta, Effient, Pradaxa, Eliquis and Xarelto. Continuation of 81 mg Aspirin is acceptable. 8. Perioperative medical clearance is not required   9. Perioperative bowel preparation with Miralax and Dulcolax. Instructions given and questions answered.   Orders Placed This Encounter:  Orders Placed This Encounter   Procedures    LAPAROSCOPY DIAGNOSTIC / BIOPSY / ASPIRATION / LYSIS     Standing Status:   Future     Standing Expiration Date:   1/19/2022     Order Specific Question:   Pre-procedure Diagnosis     Answer:   Peritoneal and omental masses uncertain etiology    COVID-19 Ambulatory     Standing Status:   Future     Number of Occurrences:   1     Standing Expiration Date:   1/19/2022     Scheduling Instructions: Saline media preferred given current shortage of viral transport media but both acceptable     Order Specific Question:   Is this test for diagnosis or screening? Answer:   Screening     Order Specific Question:   Symptomatic for COVID-19 as defined by CDC? Answer:   No     Order Specific Question:   Date of Symptom Onset     Answer:   N/A     Order Specific Question:   Hospitalized for COVID-19? Answer:   No     Order Specific Question:   Admitted to ICU for COVID-19? Answer:   No     Order Specific Question:   Employed in healthcare setting? Answer:   No     Order Specific Question:   Resident in a congregate (group) care setting? Answer:   No     Order Specific Question:   Pregnant? Answer:   No     Order Specific Question:   Previously tested for COVID-19? Answer:   Unknown    Hemoglobin and Hematocrit, Blood     Standing Status:   Future     Standing Expiration Date:   1/20/2022                If I can provide any additional assistance or you have any concerns, please feel free to contact me. Thank you for allowing to participate in the care of your patients. Sincerely,      Kylah Mott MD FACS  [address]  [place], [facility]  Office: [phone]  Fax: [phone]

## 2021-01-20 NOTE — TELEPHONE ENCOUNTER
Scheduled Jayne for a diagnostic laparoscopy with biopsy & frozen section, poss open on Tues 1/26 at 7:15 & she will arrive to the hospital at 5:45am. Bryan Taoist will be npo after midnight, consent was signed, antibacterial soap was given as well as orders for an h&h and covid.

## 2021-01-21 ENCOUNTER — OFFICE VISIT (OUTPATIENT)
Dept: FAMILY MEDICINE CLINIC | Age: 66
End: 2021-01-21
Payer: MEDICARE

## 2021-01-21 VITALS
DIASTOLIC BLOOD PRESSURE: 70 MMHG | BODY MASS INDEX: 23.08 KG/M2 | SYSTOLIC BLOOD PRESSURE: 120 MMHG | TEMPERATURE: 97.3 F | WEIGHT: 143 LBS | HEART RATE: 101 BPM | OXYGEN SATURATION: 98 %

## 2021-01-21 DIAGNOSIS — K66.8 OMENTAL MASS: ICD-10-CM

## 2021-01-21 DIAGNOSIS — R10.32 LLQ PAIN: ICD-10-CM

## 2021-01-21 DIAGNOSIS — G47.00 INSOMNIA, UNSPECIFIED TYPE: ICD-10-CM

## 2021-01-21 DIAGNOSIS — R93.3 ABNORMAL CT SCAN, SIGMOID COLON: ICD-10-CM

## 2021-01-21 DIAGNOSIS — F41.9 ANXIETY: Primary | ICD-10-CM

## 2021-01-21 LAB — SARS-COV-2: NOT DETECTED

## 2021-01-21 PROCEDURE — 1123F ACP DISCUSS/DSCN MKR DOCD: CPT | Performed by: FAMILY MEDICINE

## 2021-01-21 PROCEDURE — G8400 PT W/DXA NO RESULTS DOC: HCPCS | Performed by: FAMILY MEDICINE

## 2021-01-21 PROCEDURE — 1036F TOBACCO NON-USER: CPT | Performed by: FAMILY MEDICINE

## 2021-01-21 PROCEDURE — 3017F COLORECTAL CA SCREEN DOC REV: CPT | Performed by: FAMILY MEDICINE

## 2021-01-21 PROCEDURE — 1090F PRES/ABSN URINE INCON ASSESS: CPT | Performed by: FAMILY MEDICINE

## 2021-01-21 PROCEDURE — 99214 OFFICE O/P EST MOD 30 MIN: CPT | Performed by: FAMILY MEDICINE

## 2021-01-21 PROCEDURE — 4040F PNEUMOC VAC/ADMIN/RCVD: CPT | Performed by: FAMILY MEDICINE

## 2021-01-21 PROCEDURE — G8484 FLU IMMUNIZE NO ADMIN: HCPCS | Performed by: FAMILY MEDICINE

## 2021-01-21 PROCEDURE — G8427 DOCREV CUR MEDS BY ELIG CLIN: HCPCS | Performed by: FAMILY MEDICINE

## 2021-01-21 PROCEDURE — G8420 CALC BMI NORM PARAMETERS: HCPCS | Performed by: FAMILY MEDICINE

## 2021-01-21 RX ORDER — CLONAZEPAM 1 MG/1
TABLET ORAL
Qty: 30 TABLET | Refills: 0 | Status: SHIPPED | OUTPATIENT
Start: 2021-01-21 | End: 2021-02-04

## 2021-01-21 RX ORDER — TRAMADOL HYDROCHLORIDE 50 MG/1
50 TABLET ORAL EVERY 8 HOURS PRN
Qty: 30 TABLET | Refills: 0 | Status: ON HOLD | OUTPATIENT
Start: 2021-01-21 | End: 2021-01-26 | Stop reason: HOSPADM

## 2021-01-21 ASSESSMENT — ENCOUNTER SYMPTOMS
CONSTIPATION: 1
BACK PAIN: 1
VOMITING: 0
NAUSEA: 0
RESPIRATORY NEGATIVE: 1
ABDOMINAL PAIN: 1
DIARRHEA: 0

## 2021-01-21 NOTE — PROGRESS NOTES
2021    Cindy Conway (:  1955) is a 76 y.o. female,Established patient, here for evaluation of the following chief complaint(s): Anxiety and Other (Pt would like to talk about pain management)      ASSESSMENT/PLAN:    1. Anxiety  -     clonazePAM (KLONOPIN) 1 MG tablet; Take 1/2 po bid prn anxiety, Disp-30 tablet, R-0Normal  2. Omental mass  3. Abnormal CT scan, sigmoid colon  4. Insomnia, unspecified type  -     clonazePAM (KLONOPIN) 1 MG tablet; Take 1/2 po bid prn anxiety, Disp-30 tablet, R-0Normal  5. LLQ pain  -     traMADol (ULTRAM) 50 MG tablet; Take 1 tablet by mouth every 8 hours as needed for Pain for up to 30 doses. , Disp-30 tablet, R-0Normal      Ok for klonopin as needed for anxiety and tramadol as needed for pain. [facility] report reviewed and no contraindications or problems noted. Proceed with laparoscopic biopsy of omental mass and colonoscopy for evaluation of the sigmoid area. She is going to get her workup done here and then go down to [place] for treatment. She has a home there. Follow up as needed      SUBJECTIVE/OBJECTIVE:    HPI  Here for follow up of LLQ pain and recent abnormal CT scan showing possible omental carcinomatosis and sigmoid colon abnormality. She was seen recently with LLQ pain, which prompted the CT. She has seen GI and general surgery and has a biopsy and colonoscopy planned for next week. She is having significant anxiety and insomnia. Klonopin has helped her sleep at night but she is wondering if she can take some during the day. She is also having significant LLQ pain and requests something for pain. Tylenol hasn't helped much. Here with her significant other today. Review of Systems   Constitutional: Negative. HENT: Negative. Respiratory: Negative. Cardiovascular: Negative. Gastrointestinal: Positive for abdominal pain and constipation. Negative for diarrhea, nausea and vomiting.    Genitourinary: Positive for pelvic

## 2021-01-25 ENCOUNTER — ANESTHESIA EVENT (OUTPATIENT)
Dept: OPERATING ROOM | Age: 66
End: 2021-01-25
Payer: MEDICARE

## 2021-01-25 NOTE — H&P
Rashmi Llanos MD [facility]  General Surgery  H and P for Surgery   Pt Name: Kasandra Shah  Date of Birth 1955   Today's Date: 1/25/2021  Medical Record Number: [id number]  Referring Provider: No ref. provider found  Primary Care Provider: Cathy Dobbins MD  No chief complaint on file. ASSESSMENT      Problem List Items Addressed This Visit     Patient Active Problem List   Diagnosis Code    Cobalamin deficiency E53.8    Asymmetrical hearing loss H91.8X9    ETD (eustachian tube dysfunction) H69.80    Negative middle ear pressure R94.120    Thyroid nodule E04.1    Tinnitus H93.19    Hyperlipidemia E78.5    Omental mass K66.8    Other ascites R18.8    LLQ pain R10.32    Change in bowel habits R19.4    Abnormal CT scan, sigmoid colon R93.3             Past Medical History:   Diagnosis Date    Anxiety     B12 deficiency     History of blood transfusion     after pregnancy-repaired blood vessel after vaginal birth    Omental mass 01/2021    Thyroid nodule           PLANS      1. Schedule Cindy for diagnostic laparoscopy with biopsy and frozen section. 2. I suspect based on reviewing her CT scan which was reviewed with the patient and her  today in the office that she likely has carcinomatosis from either gastric ovarian pancreatic or colon cancer. Reviewing her CT scan with her the largest nodule seen measured 1.4 cm but appeared to be difficult to access with CT I think the diagnostic laparoscopy with visualization and adequate biopsies will be important for evaluation.   3. She is scheduled for a colonoscopy 2 days after her laparoscopy which I do not think will be an issue told her to go ahead and keep that appointment and proceed Potential diagnostic and therapeutic modalities were discussed with the patient including surgical and nonsurgical options. The risks, complications and benefits of the options were discussed. Complications including, but not limited to, bleeding, wound infection, anastomotic leak, blood clots, bowel obstruction, other organ injury, intra-abdominal abscess, and need for colostomy were reviewed. The patient was given an opportunity to ask questions. Once answered, the patient is agreeable to proceed with surgery. The advantages and disadvantages of using this robotic technology were discussed with the patient including but not limited to technical issues, limitations with exposure and potential conversion to an open procedure. The patient was given an opportunity to ask questions. Once answered, the patient is agreeable to proceed with a planned robotic assisted approach. (ROBOT)  4. Status: outpatient  5. Planned anesthesia: general  6. She will undergo pre-operative clearance per anesthesia guidelines with risk factors listed under the past medical history diagnosis & problem list.  7. Perioperative discontinuation of ASA, Plavix, warfarin, Brillinta, Effient, Pradaxa, Eliquis and Xarelto. Continuation of 81 mg Aspirin is acceptable. 8. Perioperative medical clearance is not required   9. Perioperative bowel preparation with Miralax and Dulcolax. Instructions given and questions answered. Orders Placed This Encounter:  No orders of the defined types were placed in this encounter.        SUBJECTIVE Cindy is a 76 y.o. female seen in the office today regarding left lower quadrant pain, change in bowel habits, and abnormal CT scan of the abdomen and pelvis showing omental and peritoneal nodularity and a small amount of ascites. She says she has a strong family history of cancer in her mother and her sisters describing both ovarian and breast cancers. She states that they all were tested and no genetic abnormalities were found. She has had a prior hysterectomy but ovaries intact. She states she is chronically had trouble her bowels but her bowels actually moving better over the past couple months and they have in a while. Her last colonoscopy was 12 years ago. She is knows no blood in her stool no change in bowel habits. She has had some weight loss but she has been working on weight loss so to her it is not unexpected. She states that she feels her now than she has over the past several years. Past Medical History  Past Medical History:   Diagnosis Date    Anxiety     B12 deficiency     History of blood transfusion     after pregnancy-repaired blood vessel after vaginal birth    Omental mass 01/2021    Thyroid nodule        Past Surgical History  Past Surgical History:   Procedure Laterality Date    APPENDECTOMY      age 26    [place]      [place]-several years ago    HYSTERECTOMY, TOTAL ABDOMINAL      age 26-still has ovaries    WRIST SURGERY Right     tendon surgery       Medications  No current facility-administered medications on file prior to encounter.       Current Outpatient Medications on File Prior to Encounter   Medication Sig Dispense Refill    atorvastatin (LIPITOR) 20 MG tablet Take 1 tablet by mouth daily 90 tablet 3    vitamin D (CHOLECALCIFEROL) 1000 UNIT TABS tablet Take 500 Units by mouth daily      vitamin B-12 (CYANOCOBALAMIN) 1000 MCG tablet Take 500 mcg by mouth daily        Allergies  Allergies   Allergen Reactions    Propoxyphene Nausea And Vomiting Family History  Family History   Problem Relation Age of Onset    Cancer Mother 36        ovarian    Breast Cancer Mother     Cancer Father         lung     Brain Cancer Father     Cancer Sister 48        breast     Cancer Sister         Ovarian,colon    No Known Problems Maternal Grandmother     No Known Problems Maternal Grandfather     No Known Problems Paternal Grandmother     No Known Problems Paternal Grandfather        Social History  Social History     Socioeconomic History    Marital status:      Spouse name: Not on file    Number of children: Not on file    Years of education: Not on file    Highest education level: Not on file   Occupational History    Not on file   Social Needs    Financial resource strain: Not on file    Food insecurity     Worry: Not on file     Inability: Not on file    Transportation needs     Medical: Not on file     Non-medical: Not on file   Tobacco Use    Smoking status: Never Smoker    Smokeless tobacco: Never Used   Substance and Sexual Activity    Alcohol use: Yes     Comment: socially    Drug use: Never    Sexual activity: Not on file   Lifestyle    Physical activity     Days per week: Not on file     Minutes per session: Not on file    Stress: Not on file   Relationships    Social connections     Talks on phone: Not on file     Gets together: Not on file     Attends Worship service: Not on file     Active member of club or organization: Not on file     Attends meetings of clubs or organizations: Not on file     Relationship status: Not on file    Intimate partner violence     Fear of current or ex partner: Not on file     Emotionally abused: Not on file     Physically abused: Not on file     Forced sexual activity: Not on file   Other Topics Concern    Not on file   Social History Narrative    Not on [id number] Maintenance   Topic Date Due    PHILIPP (modify frequency per FRAX score)  08/23/2009  Annual Wellness Visit (AWV)  11/05/2019    Breast cancer screen  06/11/2021    Lipid screen  08/25/2021    Colon cancer screen fecal DNA test (Cologuard)  01/22/2023    DTaP/Tdap/Td vaccine (2 - Td) 04/04/2029    Flu vaccine  Completed    Shingles Vaccine  Completed    Pneumococcal 65+ years Vaccine  Completed    Hepatitis C screen  Completed    Hepatitis A vaccine  Aged Out    Hepatitis B vaccine  Aged Out    Hib vaccine  Aged Out    Meningococcal (ACWY) vaccine  Aged Out       Review of Systems  Constitutional: Negative for activity change, appetite change, chills, diaphoresis, fatigue, fever and unexpected weight change. HENT: Negative for congestion, dental problem, drooling, ear discharge, ear pain, facial swelling, hearing loss, mouth sores, nosebleeds, postnasal drip, rhinorrhea, sinus pressure, sneezing, sore throat, tinnitus, trouble swallowing and voice change. Eyes: Negative for photophobia, pain, discharge, redness, itching and visual disturbance. Respiratory: Negative for apnea, cough, choking, chest tightness, shortness of breath, wheezing and stridor. Cardiovascular: Negative for chest pain, palpitations and leg swelling. Gastrointestinal: Positive for abdominal pain. Negative for abdominal distention, anal bleeding, blood in stool, constipation, diarrhea, nausea, rectal pain and vomiting. Endocrine: Negative for cold intolerance, heat intolerance, polydipsia, polyphagia and polyuria. Genitourinary: Negative for decreased urine volume, difficulty urinating, dysuria, enuresis, flank pain, frequency, genital sores, hematuria and urgency. Musculoskeletal: Positive for back pain. Negative for arthralgias, gait problem, joint swelling, myalgias, neck pain and neck stiffness. Skin: Negative for color change, pallor, rash and wound. Allergic/Immunologic: Negative for environmental allergies, food allergies and immunocompromised state. EXTREMITIES: no cyanosis, no clubbing and no edema.                    Electronically signed by Naomi Barr MD on 1/25/2021 at 3:24 PM

## 2021-01-25 NOTE — PROGRESS NOTES
Patient contacted regarding COVID-19 screen. Test was done at [facility]  Following questions asked: In the last month, have you been in contact with someone who was confirmed or suspected to have Coronavirus/COVID-19:  Patient stated NO      Pt was informed can be a visitor allowed. Please bring masks. Do you or family members have any of the following symptoms:  Cough-no   Muscle pain-no   Shortness of breath-no   Fever-no   Weakness-no  Severe headache-no   Sore throat-no   Respiratory symptoms-no    Have you traveled internationally in the last month? No     Have you been to the emergency room recently-no     Inform pt will need to have urine test done if she hasn't had a tubal ligation or hysterectomy.    no no

## 2021-01-26 ENCOUNTER — ANESTHESIA (OUTPATIENT)
Dept: OPERATING ROOM | Age: 66
End: 2021-01-26
Payer: MEDICARE

## 2021-01-26 ENCOUNTER — HOSPITAL ENCOUNTER (OUTPATIENT)
Age: 66
Setting detail: OUTPATIENT SURGERY
Discharge: HOME | End: 2021-01-26
Attending: SURGERY | Admitting: SURGERY
Payer: MEDICARE

## 2021-01-26 VITALS
RESPIRATION RATE: 16 BRPM | BODY MASS INDEX: 23.08 KG/M2 | WEIGHT: 143.6 LBS | DIASTOLIC BLOOD PRESSURE: 55 MMHG | HEART RATE: 95 BPM | OXYGEN SATURATION: 96 % | TEMPERATURE: 96.9 F | HEIGHT: 66 IN | SYSTOLIC BLOOD PRESSURE: 116 MMHG

## 2021-01-26 VITALS — SYSTOLIC BLOOD PRESSURE: 159 MMHG | TEMPERATURE: 96.8 F | OXYGEN SATURATION: 100 % | DIASTOLIC BLOOD PRESSURE: 81 MMHG

## 2021-01-26 DIAGNOSIS — R10.32 LLQ PAIN: ICD-10-CM

## 2021-01-26 DIAGNOSIS — Z98.890 S/P LAPAROSCOPY: Primary | ICD-10-CM

## 2021-01-26 PROCEDURE — 3600000014 HC SURGERY LEVEL 4 ADDTL 15MIN: Performed by: SURGERY

## 2021-01-26 PROCEDURE — 6360000002 HC RX W HCPCS: Performed by: ANESTHESIOLOGY

## 2021-01-26 PROCEDURE — 2500000003 HC RX 250 WO HCPCS: Performed by: NURSE ANESTHETIST, CERTIFIED REGISTERED

## 2021-01-26 PROCEDURE — 2580000003 HC RX 258: Performed by: SURGERY

## 2021-01-26 PROCEDURE — 3700000000 HC ANESTHESIA ATTENDED CARE: Performed by: SURGERY

## 2021-01-26 PROCEDURE — 6360000002 HC RX W HCPCS: Performed by: NURSE ANESTHETIST, CERTIFIED REGISTERED

## 2021-01-26 PROCEDURE — 88342 IMHCHEM/IMCYTCHM 1ST ANTB: CPT

## 2021-01-26 PROCEDURE — 6360000002 HC RX W HCPCS

## 2021-01-26 PROCEDURE — 2720000010 HC SURG SUPPLY STERILE: Performed by: SURGERY

## 2021-01-26 PROCEDURE — 6360000002 HC RX W HCPCS: Performed by: SURGERY

## 2021-01-26 PROCEDURE — 88305 TISSUE EXAM BY PATHOLOGIST: CPT

## 2021-01-26 PROCEDURE — 6370000000 HC RX 637 (ALT 250 FOR IP)

## 2021-01-26 PROCEDURE — 88112 CYTOPATH CELL ENHANCE TECH: CPT

## 2021-01-26 PROCEDURE — 7100000001 HC PACU RECOVERY - ADDTL 15 MIN: Performed by: SURGERY

## 2021-01-26 PROCEDURE — 2500000003 HC RX 250 WO HCPCS: Performed by: SURGERY

## 2021-01-26 PROCEDURE — 7100000011 HC PHASE II RECOVERY - ADDTL 15 MIN: Performed by: SURGERY

## 2021-01-26 PROCEDURE — 6370000000 HC RX 637 (ALT 250 FOR IP): Performed by: SURGERY

## 2021-01-26 PROCEDURE — 7100000000 HC PACU RECOVERY - FIRST 15 MIN: Performed by: SURGERY

## 2021-01-26 PROCEDURE — 3700000001 HC ADD 15 MINUTES (ANESTHESIA): Performed by: SURGERY

## 2021-01-26 PROCEDURE — 2709999900 HC NON-CHARGEABLE SUPPLY: Performed by: SURGERY

## 2021-01-26 PROCEDURE — 7100000010 HC PHASE II RECOVERY - FIRST 15 MIN: Performed by: SURGERY

## 2021-01-26 PROCEDURE — 88331 PATH CONSLTJ SURG 1 BLK 1SPC: CPT

## 2021-01-26 PROCEDURE — 88341 IMHCHEM/IMCYTCHM EA ADD ANTB: CPT

## 2021-01-26 PROCEDURE — 3600000004 HC SURGERY LEVEL 4 BASE: Performed by: SURGERY

## 2021-01-26 PROCEDURE — 49320 DIAG LAPARO SEPARATE PROC: CPT | Performed by: SURGERY

## 2021-01-26 RX ORDER — SODIUM CHLORIDE 0.9 % (FLUSH) 0.9 %
10 SYRINGE (ML) INJECTION EVERY 12 HOURS SCHEDULED
Status: DISCONTINUED | OUTPATIENT
Start: 2021-01-26 | End: 2021-01-26 | Stop reason: HOSPADM

## 2021-01-26 RX ORDER — LIDOCAINE HCL/PF 100 MG/5ML
SYRINGE (ML) INJECTION PRN
Status: DISCONTINUED | OUTPATIENT
Start: 2021-01-26 | End: 2021-01-26 | Stop reason: SDUPTHER

## 2021-01-26 RX ORDER — FENTANYL CITRATE 50 UG/ML
50 INJECTION, SOLUTION INTRAMUSCULAR; INTRAVENOUS EVERY 5 MIN PRN
Status: DISCONTINUED | OUTPATIENT
Start: 2021-01-26 | End: 2021-01-26 | Stop reason: HOSPADM

## 2021-01-26 RX ORDER — MORPHINE SULFATE 2 MG/ML
2 INJECTION, SOLUTION INTRAMUSCULAR; INTRAVENOUS
Status: DISCONTINUED | OUTPATIENT
Start: 2021-01-26 | End: 2021-01-26 | Stop reason: HOSPADM

## 2021-01-26 RX ORDER — PROPOFOL 10 MG/ML
INJECTION, EMULSION INTRAVENOUS PRN
Status: DISCONTINUED | OUTPATIENT
Start: 2021-01-26 | End: 2021-01-26 | Stop reason: SDUPTHER

## 2021-01-26 RX ORDER — HYDROCODONE BITARTRATE AND ACETAMINOPHEN 5; 325 MG/1; MG/1
1 TABLET ORAL EVERY 4 HOURS PRN
Status: DISCONTINUED | OUTPATIENT
Start: 2021-01-26 | End: 2021-01-26 | Stop reason: HOSPADM

## 2021-01-26 RX ORDER — MEPERIDINE HYDROCHLORIDE 25 MG/ML
12.5 INJECTION INTRAMUSCULAR; INTRAVENOUS; SUBCUTANEOUS EVERY 5 MIN PRN
Status: COMPLETED | OUTPATIENT
Start: 2021-01-26 | End: 2021-01-26

## 2021-01-26 RX ORDER — MEPERIDINE HYDROCHLORIDE 25 MG/ML
INJECTION INTRAMUSCULAR; INTRAVENOUS; SUBCUTANEOUS
Status: COMPLETED
Start: 2021-01-26 | End: 2021-01-26

## 2021-01-26 RX ORDER — ONDANSETRON 2 MG/ML
INJECTION INTRAMUSCULAR; INTRAVENOUS PRN
Status: DISCONTINUED | OUTPATIENT
Start: 2021-01-26 | End: 2021-01-26 | Stop reason: SDUPTHER

## 2021-01-26 RX ORDER — ONDANSETRON 2 MG/ML
4 INJECTION INTRAMUSCULAR; INTRAVENOUS EVERY 6 HOURS PRN
Status: DISCONTINUED | OUTPATIENT
Start: 2021-01-26 | End: 2021-01-26 | Stop reason: HOSPADM

## 2021-01-26 RX ORDER — MIDAZOLAM HYDROCHLORIDE 1 MG/ML
INJECTION INTRAMUSCULAR; INTRAVENOUS PRN
Status: DISCONTINUED | OUTPATIENT
Start: 2021-01-26 | End: 2021-01-26 | Stop reason: SDUPTHER

## 2021-01-26 RX ORDER — DEXAMETHASONE SODIUM PHOSPHATE 10 MG/ML
INJECTION, EMULSION INTRAMUSCULAR; INTRAVENOUS PRN
Status: DISCONTINUED | OUTPATIENT
Start: 2021-01-26 | End: 2021-01-26 | Stop reason: SDUPTHER

## 2021-01-26 RX ORDER — SODIUM CHLORIDE 0.9 % (FLUSH) 0.9 %
10 SYRINGE (ML) INJECTION PRN
Status: DISCONTINUED | OUTPATIENT
Start: 2021-01-26 | End: 2021-01-26 | Stop reason: HOSPADM

## 2021-01-26 RX ORDER — IBUPROFEN 400 MG/1
400 TABLET ORAL ONCE
Status: COMPLETED | OUTPATIENT
Start: 2021-01-26 | End: 2021-01-26

## 2021-01-26 RX ORDER — ACETAMINOPHEN 325 MG/1
650 TABLET ORAL EVERY 6 HOURS PRN
COMMUNITY

## 2021-01-26 RX ORDER — ROCURONIUM BROMIDE 10 MG/ML
INJECTION, SOLUTION INTRAVENOUS PRN
Status: DISCONTINUED | OUTPATIENT
Start: 2021-01-26 | End: 2021-01-26 | Stop reason: SDUPTHER

## 2021-01-26 RX ORDER — SCOLOPAMINE TRANSDERMAL SYSTEM 1 MG/1
1 PATCH, EXTENDED RELEASE TRANSDERMAL ONCE
Status: DISCONTINUED | OUTPATIENT
Start: 2021-01-26 | End: 2021-01-26 | Stop reason: HOSPADM

## 2021-01-26 RX ORDER — SODIUM CHLORIDE 9 MG/ML
INJECTION, SOLUTION INTRAVENOUS CONTINUOUS
Status: DISCONTINUED | OUTPATIENT
Start: 2021-01-26 | End: 2021-01-26 | Stop reason: HOSPADM

## 2021-01-26 RX ORDER — ONDANSETRON 4 MG/1
4 TABLET, ORALLY DISINTEGRATING ORAL EVERY 8 HOURS PRN
Status: DISCONTINUED | OUTPATIENT
Start: 2021-01-26 | End: 2021-01-26 | Stop reason: HOSPADM

## 2021-01-26 RX ORDER — FENTANYL CITRATE 50 UG/ML
INJECTION, SOLUTION INTRAMUSCULAR; INTRAVENOUS PRN
Status: DISCONTINUED | OUTPATIENT
Start: 2021-01-26 | End: 2021-01-26 | Stop reason: SDUPTHER

## 2021-01-26 RX ORDER — LABETALOL 20 MG/4 ML (5 MG/ML) INTRAVENOUS SYRINGE
10 EVERY 10 MIN PRN
Status: DISCONTINUED | OUTPATIENT
Start: 2021-01-26 | End: 2021-01-26 | Stop reason: HOSPADM

## 2021-01-26 RX ORDER — BUPIVACAINE HYDROCHLORIDE 5 MG/ML
INJECTION, SOLUTION PERINEURAL PRN
Status: DISCONTINUED | OUTPATIENT
Start: 2021-01-26 | End: 2021-01-26 | Stop reason: ALTCHOICE

## 2021-01-26 RX ORDER — TRAMADOL HYDROCHLORIDE 100 MG/1
100 TABLET, COATED ORAL EVERY 6 HOURS PRN
Qty: 20 TABLET | Refills: 0 | Status: SHIPPED | OUTPATIENT
Start: 2021-01-26 | End: 2021-01-31

## 2021-01-26 RX ORDER — MORPHINE SULFATE 2 MG/ML
2 INJECTION, SOLUTION INTRAMUSCULAR; INTRAVENOUS EVERY 5 MIN PRN
Status: DISCONTINUED | OUTPATIENT
Start: 2021-01-26 | End: 2021-01-26 | Stop reason: HOSPADM

## 2021-01-26 RX ORDER — ACETAMINOPHEN 500 MG
1000 TABLET ORAL ONCE
Status: COMPLETED | OUTPATIENT
Start: 2021-01-26 | End: 2021-01-26

## 2021-01-26 RX ORDER — HYDROCODONE BITARTRATE AND ACETAMINOPHEN 5; 325 MG/1; MG/1
2 TABLET ORAL EVERY 4 HOURS PRN
Status: DISCONTINUED | OUTPATIENT
Start: 2021-01-26 | End: 2021-01-26 | Stop reason: HOSPADM

## 2021-01-26 RX ORDER — DEXAMETHASONE SODIUM PHOSPHATE 10 MG/ML
8 INJECTION, EMULSION INTRAMUSCULAR; INTRAVENOUS ONCE
Status: COMPLETED | OUTPATIENT
Start: 2021-01-26 | End: 2021-01-26

## 2021-01-26 RX ORDER — SCOLOPAMINE TRANSDERMAL SYSTEM 1 MG/1
PATCH, EXTENDED RELEASE TRANSDERMAL
Status: DISCONTINUED
Start: 2021-01-26 | End: 2021-01-26 | Stop reason: HOSPADM

## 2021-01-26 RX ORDER — MORPHINE SULFATE 2 MG/ML
4 INJECTION, SOLUTION INTRAMUSCULAR; INTRAVENOUS
Status: DISCONTINUED | OUTPATIENT
Start: 2021-01-26 | End: 2021-01-26 | Stop reason: HOSPADM

## 2021-01-26 RX ADMIN — MEPERIDINE HYDROCHLORIDE 12.5 MG: 25 INJECTION INTRAMUSCULAR; INTRAVENOUS; SUBCUTANEOUS at 08:55

## 2021-01-26 RX ADMIN — MEPERIDINE HYDROCHLORIDE 12.5 MG: 25 INJECTION INTRAMUSCULAR; INTRAVENOUS; SUBCUTANEOUS at 08:50

## 2021-01-26 RX ADMIN — DEXAMETHASONE SODIUM PHOSPHATE 8 MG: 10 INJECTION, EMULSION INTRAMUSCULAR; INTRAVENOUS at 06:27

## 2021-01-26 RX ADMIN — PROPOFOL 140 MG: 10 INJECTION, EMULSION INTRAVENOUS at 07:21

## 2021-01-26 RX ADMIN — SODIUM CHLORIDE: 9 INJECTION, SOLUTION INTRAVENOUS at 07:13

## 2021-01-26 RX ADMIN — ONDANSETRON HYDROCHLORIDE 4 MG: 4 INJECTION, SOLUTION INTRAMUSCULAR; INTRAVENOUS at 07:43

## 2021-01-26 RX ADMIN — ACETAMINOPHEN 1000 MG: 500 TABLET ORAL at 06:27

## 2021-01-26 RX ADMIN — Medication 60 MG: at 07:21

## 2021-01-26 RX ADMIN — ROCURONIUM BROMIDE 5 MG: 10 INJECTION INTRAVENOUS at 07:21

## 2021-01-26 RX ADMIN — DEXAMETHASONE SODIUM PHOSPHATE 8 MG: 10 INJECTION, EMULSION INTRAMUSCULAR; INTRAVENOUS at 07:43

## 2021-01-26 RX ADMIN — SUGAMMADEX 200 MG: 100 INJECTION, SOLUTION INTRAVENOUS at 07:55

## 2021-01-26 RX ADMIN — ROCURONIUM BROMIDE 25 MG: 10 INJECTION INTRAVENOUS at 07:22

## 2021-01-26 RX ADMIN — FENTANYL CITRATE 100 MCG: 50 INJECTION, SOLUTION INTRAMUSCULAR; INTRAVENOUS at 07:21

## 2021-01-26 RX ADMIN — SODIUM CHLORIDE: 9 INJECTION, SOLUTION INTRAVENOUS at 06:23

## 2021-01-26 RX ADMIN — MIDAZOLAM HYDROCHLORIDE 1 MG: 1 INJECTION, SOLUTION INTRAMUSCULAR; INTRAVENOUS at 07:13

## 2021-01-26 RX ADMIN — FENTANYL CITRATE 50 MCG: 50 INJECTION, SOLUTION INTRAMUSCULAR; INTRAVENOUS at 07:31

## 2021-01-26 RX ADMIN — CEFAZOLIN 2 G: 10 INJECTION, POWDER, FOR SOLUTION INTRAVENOUS at 07:27

## 2021-01-26 RX ADMIN — IBUPROFEN 400 MG: 400 TABLET, FILM COATED ORAL at 06:27

## 2021-01-26 ASSESSMENT — PULMONARY FUNCTION TESTS
PIF_VALUE: 13
PIF_VALUE: 19
PIF_VALUE: 15
PIF_VALUE: 15
PIF_VALUE: 16
PIF_VALUE: 18
PIF_VALUE: 4
PIF_VALUE: 4
PIF_VALUE: 19
PIF_VALUE: 15
PIF_VALUE: 19
PIF_VALUE: 19
PIF_VALUE: 4
PIF_VALUE: 18
PIF_VALUE: 12
PIF_VALUE: 18
PIF_VALUE: 12
PIF_VALUE: 17
PIF_VALUE: 16
PIF_VALUE: 13
PIF_VALUE: 17
PIF_VALUE: 16
PIF_VALUE: 19
PIF_VALUE: 12
PIF_VALUE: 19
PIF_VALUE: 2
PIF_VALUE: 2

## 2021-01-26 ASSESSMENT — PAIN SCALES - GENERAL
PAINLEVEL_OUTOF10: 3
PAINLEVEL_OUTOF10: 3
PAINLEVEL_OUTOF10: 2
PAINLEVEL_OUTOF10: 2
PAINLEVEL_OUTOF10: 5

## 2021-01-26 ASSESSMENT — PAIN - FUNCTIONAL ASSESSMENT: PAIN_FUNCTIONAL_ASSESSMENT: 0-10

## 2021-01-26 ASSESSMENT — PAIN DESCRIPTION - DESCRIPTORS: DESCRIPTORS: ACHING

## 2021-01-26 NOTE — OP NOTE
[address] [address]  Operative Report    PATIENT NAME: Toni Hinojosa  MEDICAL RECORD NO. [id number]  SURGEON: Racquel Pardo MD FACS  Primary Care Physician: Milli Farooq MD  Date: 1/26/2021, 8:16 AM     PROCEDURE PERFORMED: Diagnostic laparoscopy with biopsy of peritoneal implants, omental implants, and fluid was retrieved for cytology  PREOPERATIVE DIAGNOSIS:   Active Hospital Problems    Diagnosis Date Noted    S/P laparoscopy and bx of omental and peritoneal lesions [Z98.890] 01/26/2021    Abnormal CT scan, sigmoid colon [R93.3] 01/19/2021    Omental mass [K66.8] 01/19/2021    Other ascites [R18.8] 01/19/2021      POSTOPERATIVE DIAGNOSIS: Same, path pending  SURGEON:  Racquel Pardo MD FACS  ANESTHESIA:  General endotracheal anesthesia and local  ANESTHESIA:  20 OF 0.5% Marcaine   ESTIMATED BLOOD LOSS:  10  ml  SPECIMEN: Peritoneal implants 1 from the left pelvis and right hemidiaphragm as well as an omental implant the peritoneal implants were sent for frozen section returned mucinous neoplasm  COMPLICATIONS:  None; patient tolerated the procedure well. DRAINS: none  DISPOSITION: PACU - hemodynamically stable. CONDITION: stable      Narrative:    Patient brought to the operating room correctly identified. General anesthesia was placed by anesthesia. Timeout was taken.   SCDs in place signed consent on the chart preop antibiotics were given  Abdomen prepped draped sterilely with ChloraPrep treatments allowed to pass draped sterilely An infraumbilical incision was made and the 5 mm Optiview port was used to gain access the abdominal cavity. Upon gaining access not a significant amount of fluid but peritoneal nodularity were seen on all peritoneal surfaces and on the right lobe of the liver. Multiple implants all subcentimeter on the peritoneal implants. Some on the liver appear to be slightly larger. Photos were taken documenting the locations and sizes of both the right and left hemidiaphragms the right pelvic sidewall the pelvic peritoneum, the right tube and ovary and the omentum. There was clear ascitic fluid of which 40 cc were aspirated and sent for cytology  We patient placed in headdown 15 degrees the sigmoid colon had nodularity on the outside stuck down on the pelvic peritoneum I could not visualize the left tube and ovary but the right tube and ovary was visualized and there was nodularity in the outside of the tube the ovary did not appear to be enlarged photo taken tried to roll over the cecum could not visualize the appendix. Anterior surface the stomach appeared normal.  With an additional 5 mm port in the left mid quadrant biopsies were taken of the peritoneal implants 1 from the left hemipelvis 1 from the right hemidiaphragm both these pieces were sent for frozen section. The omentum was examined and the larger nodule they saw that measured 14 mm was identified although there were more than 1 but this was the largest and portions of this were resected with cautery and forceps and saved for permanent section. Cautery was used for hemostasis any loose blood was aspirated. At the end of the procedure hemostasis was adequate the ports were removed and the abdomen was desufflated.   Frozen section returned mucinous neoplasm  Skin incisions were closed with 4-0 Vicryl subcuticular stitch  Dermabond skin glue was applied  20 cc half percent Marcaine injected at the 2 sites  Patient brought to recovery room stable condition

## 2021-01-26 NOTE — ANESTHESIA PRE PROCEDURE
Department of Anesthesiology  Preprocedure Note       Name:  Alejandro Chiu   Age:  76 y.o.  :  1955                                          MRN:  [id number]         Date:  2021      Surgeon: Neo Robertson):  Heidi Mccray MD    Procedure: Procedure(s):  DIAGNOSTIC LAPAROSCOPY WITH BIOPSY AND FROZEN SECTION, POSS OPEN    Medications prior to admission:   Prior to Admission medications    Medication Sig Start Date End Date Taking? Authorizing Provider   acetaminophen (TYLENOL) 325 MG tablet Take 650 mg by mouth every 6 hours as needed for Pain   Yes Historical Provider, MD   clonazePAM (KLONOPIN) 1 MG tablet Take 1/2 po bid prn anxiety 21 Yes Radha Grossman MD   atorvastatin (LIPITOR) 20 MG tablet Take 1 tablet by mouth daily 20  Yes Radha Grossman MD   vitamin D (CHOLECALCIFEROL) 1000 UNIT TABS tablet Take 500 Units by mouth daily   Yes Historical Provider, MD   vitamin B-12 (CYANOCOBALAMIN) 1000 MCG tablet Take 500 mcg by mouth daily    Yes Historical Provider, MD   traMADol (ULTRAM) 50 MG tablet Take 1 tablet by mouth every 8 hours as needed for Pain for up to 30 doses. 21  Radha Grossman MD       Current medications:    Current Facility-Administered Medications   Medication Dose Route Frequency Provider Last Rate Last Admin    0.9 % sodium chloride infusion   Intravenous Continuous Heidi Mccray  mL/hr at 21 0623 New Bag at 21 2361    sodium chloride flush 0.9 % injection 10 mL  10 mL Intravenous 2 times per day Heidi Mccray MD        sodium chloride flush 0.9 % injection 10 mL  10 mL Intravenous PRN Heidi Mccray MD        ceFAZolin (ANCEF) 2000 mg in dextrose 5 % 50 mL IVPB  2,000 mg Intravenous On Call to MD Petar        scopolamine (TRANSDERM-SCOP) transdermal patch 1 patch  1 patch Transdermal Once Quinton Dumont MD   1 patch at 21 1710       Allergies:     Allergies   Allergen Reactions  Propoxyphene Nausea And Vomiting       Problem List:    Patient Active Problem List   Diagnosis Code    Cobalamin deficiency E53.8    Asymmetrical hearing loss H91.8X9    ETD (eustachian tube dysfunction) H69.80    Negative middle ear pressure R94.120    Thyroid nodule E04.1    Tinnitus H93.19    Hyperlipidemia E78.5    Omental mass K66.8    Other ascites R18.8    LLQ pain R10.32    Change in bowel habits R19.4    Abnormal CT scan, sigmoid colon R93.3       Past Medical History:        Diagnosis Date    Anxiety     B12 deficiency     History of blood transfusion     after pregnancy-repaired blood vessel after vaginal birth    Omental mass 01/2021    PONV (postoperative nausea and vomiting)     Thyroid nodule        Past Surgical History:        Procedure Laterality Date    APPENDECTOMY      age 26    [place]ONOSCOPY      [place]-several years ago   Duana Hark HYSTERECTOMY, TOTAL ABDOMINAL      age 26-still has ovaries    WRIST SURGERY Right     tendon surgery       Social History:    Social History     Tobacco Use    Smoking status: Never Smoker    Smokeless tobacco: Never Used   Substance Use Topics    Alcohol use: Not Currently     Comment: socially                                Counseling given: Not Answered      Vital Signs (Current):   Vitals:    01/26/21 0551   BP: 103/69   Pulse: 92   Resp: 16   Temp: 97.4 °F (36.3 °C)   TempSrc: Temporal   SpO2: 96%   Weight: 143 lb 9.6 oz (65.1 kg)   Height: 5' 6\" (1.676 m)                                              BP Readings from Last 3 Encounters:   01/26/21 103/69   01/21/21 120/70   01/20/21 122/78       NPO Status: Time of last liquid consumption: 1930                        Time of last solid consumption: 1930                        Date of last liquid consumption: 01/25/21                        Date of last solid food consumption: 01/25/21    BMI:   Wt Readings from Last 3 Encounters:   01/26/21 143 lb 9.6 oz (65.1 kg)   01/21/21 143 lb (64.9 kg) 01/20/21 142 lb 1.6 oz (64.5 kg)     Body mass index is 23.18 kg/m². CBC:   Lab Results   Component Value Date    WBC 4.3 05/01/2019    RBC 4.49 05/01/2019    HGB 14.0 01/20/2021    HCT 43.3 01/20/2021    MCV 93.1 05/01/2019     05/01/2019       CMP:   Lab Results   Component Value Date     05/01/2019    K 4.5 05/01/2019     05/01/2019    CO2 26 05/01/2019    BUN 14 05/01/2019    CREATININE 0.5 05/01/2019    LABGLOM >90 05/01/2019    GLUCOSE 105 05/01/2019    PROT 6.6 05/01/2019    CALCIUM 8.8 05/01/2019    BILITOT 0.3 05/01/2019    ALKPHOS 83 05/01/2019    AST 15 05/01/2019    ALT 12 05/01/2019       POC Tests: No results for input(s): POCGLU, POCNA, POCK, POCCL, POCBUN, POCHEMO, POCHCT in the last 72 hours. Coags: No results found for: PROTIME, INR, APTT    HCG (If Applicable): No results found for: PREGTESTUR, PREGSERUM, HCG, HCGQUANT     ABGs: No results found for: PHART, PO2ART, SXN0PLL, XEI0ABA, BEART, T5NWQFYQ     Type & Screen (If Applicable):  No results found for: LABABO, LABRH    Drug/Infectious Status (If Applicable):  No results found for: HIV, HEPCAB    COVID-19 Screening (If Applicable):   Lab Results   Component Value Date    COVID19 Not Detected 01/20/2021         Anesthesia Evaluation     history of anesthetic complications: PONV. Airway: Mallampati: II  TM distance: >3 FB   Neck ROM: full  Mouth opening: > = 3 FB Dental:          Pulmonary: breath sounds clear to auscultation                             Cardiovascular:            Rhythm: regular                      Neuro/Psych:               GI/Hepatic/Renal:             Endo/Other:                     Abdominal:           Vascular:                                        Anesthesia Plan      general     ASA 2       Induction: intravenous. MIPS: Postoperative opioids intended and Prophylactic antiemetics administered. Anesthetic plan and risks discussed with patient. Plan discussed with CRNA. Rodney Edwards MD   1/26/2021

## 2021-01-26 NOTE — PROGRESS NOTES
Pt and family oriented to SDS 5 and unit. Pt verbalized approval for first name, last initial and physician name on unit whiteboard. Fall band applied. Vaccine information given. Plan of care reviewed.

## 2021-01-26 NOTE — PROGRESS NOTES
Pt returned to [facility] room 5. Vitals and assessment as charted. 0.9 infusing, IV patent. Pt has joan mist and muffin. Family at the bedside. Pt and family verbalized understanding of discharge criteria and call light use. Call light in reach.

## 2021-01-26 NOTE — H&P
[address] [address]  History and Physical Update    Pt Name: Natanael Ambriz  MRN: [id number]  YOB: 1955  Date of evaluation: 1/26/2021    [x] I have examined the patient and reviewed the H&P/Consult and there are no changes to the patient or plans.     [] I have examined the patient and reviewed the H&P/Consult and have noted the following changes:        Kylah Mott  Electronically signed 1/26/2021 at 6:57 AM

## 2021-01-26 NOTE — PROGRESS NOTES

## 2021-01-27 ENCOUNTER — TELEPHONE (OUTPATIENT)
Dept: SURGERY | Age: 66
End: 2021-01-27

## 2021-01-27 NOTE — TELEPHONE ENCOUNTER
S/p 1/26  Diagnostic laparoscopy with biopsy of peritoneal implants, omental implants, and fluid was retrieved for cytology. Pt states she is feeling good this morning- states she is using the bathroom with no issues. Pt states that she feels bloated and gained 4 pounds- pt informed of gas used during surgery and how it can make you feel full- pt states that she is passing gas and denies any other issues. Advised to call the office with any questions or concerns.

## 2021-01-28 ENCOUNTER — NURSE ONLY (OUTPATIENT)
Dept: LAB | Age: 66
End: 2021-01-28

## 2021-01-28 NOTE — PROGRESS NOTES
Rebecca Nicole MD [facility]  General Surgery  Postprocedure Evaluation in Office  Pt Name: Lotus Mendieta  Date of Birth 1955   Today's Date: 1/28/2021  Medical Record Number: [id number]  Referring Provider: No ref. provider found  Primary Care Provider: Kelvin Degroot MD  Chief Complaint   Patient presents with   [facility] Post-Op Check     s/p  Diagnostic laparoscopy with biopsy of peritoneal implants, omental implants, and fluid was retrieved for cytology 1/26/21     ASSESSMENT      Problem List Items Addressed This Visit     S/P laparoscopy and bx of omental and peritoneal lesions - Primary           PLANS       Pathology reviewed with the patient who understands. All questions were answered. Clinical Information: OMENTAL MASS, OTHER ASCITES     FINAL DIAGNOSIS:   A-B.  Peritoneum, implant, and omentum, nodule, biopsies:    Mucinous adenocarcinoma.    See microscopic description  The specimens consist of fibroadipose tissue with mucinous   adenocarcinoma in pools of mucin.  Immunohistochemistry for CK7, CK20,   CDX2, TTF-1, WT1, PAX8, and ER is performed with adequate controls on   block A.  The malignant cells are positive for CK7, CDX2, and CK20, and   negative for ER, WT-1, PAX-8, and TTF-1.  This immunophenotype is not   specific for site of origin    She had an appendectomy at age 26 so most likely this represents ovarian or primary peritoneal    There are no Patient Instructions on file for this visit. Follow up: No follow-ups on file. Orders Placed This Encounter:  No orders of the defined types were placed in this encounter.         SUBJECTIVE Cindy is seen today for post-op follow-up. She is 1 week(s) status post laparosocpy and biopsy . She is tolerating a regular diet, having regular bowel movements. Symptoms and activity have gradually improved compared to preoperative. The surgical site is clean and has no drainage. Pain is controlled without any medications. . She has compliant with postoperative instructions. She already has an appointment to be seen at the cancer center in [place] which she made before her surgery. Per the pathologist mucinous neoplasms like this are usually appendiceal or ovarian and she had an appendectomy at age 26.   So most likely this represents ovarian  Past Medical History  Past Medical History:   Diagnosis Date    Anxiety     B12 deficiency     History of blood transfusion     after pregnancy-repaired blood vessel after vaginal birth    Omental mass 01/2021    PONV (postoperative nausea and vomiting)     Thyroid nodule      Past Surgical History  Past Surgical History:   Procedure Laterality Date    APPENDECTOMY      age 26    [place]COPY      [place] years ago    HYSTERECTOMY, TOTAL ABDOMINAL      age 26-still has ovaries    LAPAROSCOPY N/A 1/26/2021    DIAGNOSTIC LAPAROSCOPY WITH omentum AND PERITONEAL  BIOPSY AND FROZEN SECTION, AND FLUID FOR CYTOLOGY performed by Rashmi Llanos MD at [address] [place] [address] Right     tendon surgery     Medications  Current Outpatient Medications on File Prior to Visit   Medication Sig Dispense Refill    acetaminophen (TYLENOL) 325 MG tablet Take 650 mg by mouth every 6 hours as needed for Pain      traMADol HCl 100 MG TABS Take 100 mg by mouth every 6 hours as needed (pain) 20 tablet 0    clonazePAM (KLONOPIN) 1 MG tablet Take 1/2 po bid prn anxiety 30 tablet 0    atorvastatin (LIPITOR) 20 MG tablet Take 1 tablet by mouth daily 90 tablet 3    vitamin D (CHOLECALCIFEROL) 1000 UNIT TABS tablet Take 500 Units by mouth daily  DTaP/Tdap/Td vaccine (2 - Td) 04/04/2029    Flu vaccine  Completed    Shingles Vaccine  Completed    Pneumococcal 65+ years Vaccine  Completed    Hepatitis C screen  Completed    Hepatitis A vaccine  Aged Out    Hepatitis B vaccine  Aged Out    Hib vaccine  Aged Out    Meningococcal (ACWY) vaccine  Aged Out     Review of Systems  History obtained from the patient. Constitutional: Denies any fever, chills, fatigue. Wound: Denies any rash, skin color changes or wound problems. Resp: Denies any cough, shortness of breath. CV: Denies any chest pain, orthopnea or syncope. GI: Positive for incisional discomfort only. Denies any nausea, vomiting, blood in the stool, constipation or diarrhea. : Denies any hematuria, hesitancy or dysuria. OBJECTIVE    VITALS:  vitals were not taken for this visit. CONSTITUTIONAL: Alert and oriented times 3, no acute distress and cooperative to examination. INCISION: wound margins intact and healing well. No signs of infection. No drainage. ABDOMEN: soft, nontender, nondistended, no masses or organomegaly. Bowel sounds normal. Laparoscopic scars present. No sign of recurrence, hematoma or seroma. Tenderness to palpation incisional only.                    Electronically signed by Janusz Colby MD FACS on 1/28/2021 at 6:46 AM

## 2021-02-01 ENCOUNTER — TELEPHONE (OUTPATIENT)
Dept: FAMILY MEDICINE CLINIC | Age: 66
End: 2021-02-01

## 2021-02-01 ENCOUNTER — OFFICE VISIT (OUTPATIENT)
Dept: SURGERY | Age: 66
End: 2021-02-01

## 2021-02-01 DIAGNOSIS — Z98.890 S/P LAPAROSCOPY: Primary | ICD-10-CM

## 2021-02-01 PROCEDURE — 99024 POSTOP FOLLOW-UP VISIT: CPT | Performed by: SURGERY

## 2021-02-01 NOTE — TELEPHONE ENCOUNTER
Reviewed results. 1 area of adenocarcinoma was found, see results below. This would best be interpreted per Dr Guillermina Mendez who did the procedure. I recommend following up with that office. Thanks -WS      FINAL DIAGNOSIS:   A. Terminal ileum, biopsy:    No significant histopathologic findings. B. Ascending colon, biopsy:    No significant histopathologic findings. C. Ascending colon, biopsy:    Tubular adenoma. D. Transverse colon, biopsy:    Ulceration with fibrinopurulent debris and reactive epithelial   changes. E. Descending colon, biopsy:    No significant histopathologic findings. F. Sigmoid colon at 30 cm, biopsy:    Invasive, moderately differentiated adenocarcinoma. G. Rectum, biopsy:    No significant histopathologic findings.

## 2021-02-01 NOTE — TELEPHONE ENCOUNTER
Patient inquiring about the biopsy results from her colonoscopy per Dr. Jenny Muller. Please advise.

## 2021-06-20 DIAGNOSIS — E78.5 HYPERLIPIDEMIA, UNSPECIFIED HYPERLIPIDEMIA TYPE: ICD-10-CM

## 2021-06-21 RX ORDER — ATORVASTATIN CALCIUM 20 MG/1
20 TABLET, FILM COATED ORAL DAILY
Qty: 90 TABLET | Refills: 0 | Status: SHIPPED | OUTPATIENT
Start: 2021-06-21 | End: 2021-11-26

## 2021-06-21 NOTE — TELEPHONE ENCOUNTER
Last written: 9-1-20 #90/3  Last seen: 1-21-21  Next visit: 7-15-21  Last lipid: 8-25-20    Order pended for #90/0

## 2021-11-24 DIAGNOSIS — E78.5 HYPERLIPIDEMIA, UNSPECIFIED HYPERLIPIDEMIA TYPE: ICD-10-CM

## 2021-11-26 RX ORDER — ATORVASTATIN CALCIUM 20 MG/1
20 TABLET, FILM COATED ORAL DAILY
Qty: 90 TABLET | Refills: 0 | Status: SHIPPED | OUTPATIENT
Start: 2021-11-26 | End: 2022-01-27

## 2021-11-26 NOTE — TELEPHONE ENCOUNTER
This medication refill is regarding a electronic request.  Refill requested by [facility]. Requested Prescriptions     Pending Prescriptions Disp Refills    atorvastatin (LIPITOR) 20 MG tablet [Pharmacy Med Name: Atorvastatin Calcium 20 MG Oral Tablet] 90 tablet 0     Sig: TAKE 1 TABLET BY MOUTH  DAILY       Date of last visit: 1/21/2021   Date of next visit: None  Date of last refill: 6/21/2021  90/0    Rx verified, ordered and set to EP.

## 2022-01-27 DIAGNOSIS — E78.5 HYPERLIPIDEMIA, UNSPECIFIED HYPERLIPIDEMIA TYPE: ICD-10-CM

## 2022-01-27 RX ORDER — ATORVASTATIN CALCIUM 20 MG/1
20 TABLET, FILM COATED ORAL DAILY
Qty: 90 TABLET | Refills: 0 | Status: SHIPPED | OUTPATIENT
Start: 2022-01-27

## 2022-01-27 NOTE — TELEPHONE ENCOUNTER
This medication refill is regarding a electronic request.  Refill requested by [facility]. Requested Prescriptions     Pending Prescriptions Disp Refills    atorvastatin (LIPITOR) 20 MG tablet [Pharmacy Med Name: Atorvastatin Calcium 20 MG Oral Tablet] 90 tablet 0     Sig: TAKE 1 TABLET BY MOUTH  DAILY     Date of last visit: 1/21/2021   Date of next visit: None  Date of last refill: 11/26/2021 #90/0    Last Lipid Panel:    Lab Results   Component Value Date    CHOL 230 06/03/2020    TRIG 67 06/03/2020    HDL 56 06/03/2020    LDLCALC 161 06/03/2020     Last CMP:   Lab Results   Component Value Date     05/01/2019    K 4.5 05/01/2019     05/01/2019    CO2 26 05/01/2019    BUN 14 05/01/2019    CREATININE 0.5 05/01/2019    GLUCOSE 105 05/01/2019    CALCIUM 8.8 05/01/2019    PROT 6.6 05/01/2019    LABALBU 4.0 05/01/2019    BILITOT 0.3 05/01/2019    ALKPHOS 83 05/01/2019    AST 15 05/01/2019    ALT 12 05/01/2019    LABGLOM >90 05/01/2019     Rx verified, ordered and set to EP.

## (undated) DEVICE — SUTURE PERMAHAND SZ 2-0 L18IN NONABSORBABLE BLK L26MM SH C012D

## (undated) DEVICE — TROCAR: Brand: KII FIOS FIRST ENTRY

## (undated) DEVICE — TUBING, SUCTION, 1/4" X 20', STRAIGHT: Brand: MEDLINE INDUSTRIES, INC.

## (undated) DEVICE — SUTURE PDS II SZ 1 L36IN ABSRB VLT L48MM CTX 1/2 CIR Z371T

## (undated) DEVICE — ADHESIVE SKIN CLSR 0.7ML TOP DERMBND ADV

## (undated) DEVICE — BANDAGE ADH W1XL3IN NAT FAB WVN FLX DURABLE N ADH PD SEAL

## (undated) DEVICE — GLOVE SURG SZ 65 THK91MIL LTX FREE SYN POLYISOPRENE

## (undated) DEVICE — TROCAR: Brand: KII SHIELDED BLADED ACCESS SYSTEM

## (undated) DEVICE — 4-PORT MANIFOLD: Brand: NEPTUNE 2

## (undated) DEVICE — APPLIER LIG CLP M L11IN TI STR RNG HNDL FOR 20 CLP DISP

## (undated) DEVICE — GLOVE ORANGE PI 7   MSG9070

## (undated) DEVICE — SOLUTION ANTIFOG VIS SYS CLEARIFY LAPSCP

## (undated) DEVICE — GLOVE ORANGE PI 8   MSG9080

## (undated) DEVICE — LINER SUCT CANSTR 1500CC SEMI RIG W/ POR HYDROPHOBIC SHUT

## (undated) DEVICE — RELOAD STPL H1-2.5X45MM VASC THN TISS WHT 6 ROW B FRM SGL

## (undated) DEVICE — APPLICATOR PREP 26ML 0.7% IOD POVACRYLEX 74% ISO ALC ST

## (undated) DEVICE — GLOVE ORANGE PI 7 1/2   MSG9075

## (undated) DEVICE — TOWEL,OR,DSP,ST,WHITE,DLX,XR,4/PK,20PK/C: Brand: MEDLINE

## (undated) DEVICE — SUTURE VCRL + SZ 2-0 L27IN ABSRB VLT UR-6 5/8 CIR TAPR PNT VCP602H

## (undated) DEVICE — GENERAL LAPAROSCOPY PACK-LF: Brand: MEDLINE INDUSTRIES, INC.

## (undated) DEVICE — POWERED LIGATING AND DIVIDING STAPLER: Brand: POWERED LDS

## (undated) DEVICE — UNIVERSAL MONOPOLAR LAPAROSCOPIC CABLE 10FT, 4MM PIN CONNECTOR: Brand: CONMED

## (undated) DEVICE — YANKAUER,BULB TIP,W/O VENT,RIGID,STERILE: Brand: MEDLINE

## (undated) DEVICE — APPLIER CLP M L L11.4IN DIA10MM ENDOSCP ROT MULT FOR LIG

## (undated) DEVICE — SPONGE LAP W18XL18IN WHT COT 4 PLY FLD STRUNG RADPQ DISP ST

## (undated) DEVICE — SUTURE PERMAHAND SZ 2-0 L12X18IN NONABSORBABLE BLK SILK A185H

## (undated) DEVICE — PUMP SUC IRR TBNG L10FT W/ HNDPC ASSEMB STRYKEFLOW 2

## (undated) DEVICE — DRESSING TRNSPAR W5XL4.5IN FLM SHT SEMIPERMEABLE WIND

## (undated) DEVICE — COVER ARMBRD W13XL28.5IN IMPERV BLU FOR OP RM

## (undated) DEVICE — SUTURE PERMAHAND SZ 2-0 L30IN NONABSORBABLE BLK SH L26MM C016D

## (undated) DEVICE — YANKAUER,POOLE TIP,STERILE,50/CS: Brand: MEDLINE

## (undated) DEVICE — SPONGE GZ W4XL4IN COT 12 PLY TYP VII WVN C FLD DSGN

## (undated) DEVICE — Device

## (undated) DEVICE — CUTTER ENDOSCP L340MM LIN ARTC SGL STROKE FIRING ENDOPATH

## (undated) DEVICE — Z DUPLICATE USE 2431315 SET INSUF TBNG HI FLO W/ SMK EVAC FOR PNEUMOCLEAR

## (undated) DEVICE — APPLIER CLP L L13IN TI MULT RNG HNDL 20 CLP STR LIGACLP

## (undated) DEVICE — RELOAD STPL SZ 0 L45MM DIA3.5MM 0DEG STD REG TISS BLU TI

## (undated) DEVICE — GOWN,SIRUS,NONRNF,SETINSLV,XL,20/CS: Brand: MEDLINE

## (undated) DEVICE — GOWN,SIRUS,NON REINFRCD,LARGE,SET IN SL: Brand: MEDLINE